# Patient Record
Sex: MALE | Race: WHITE | Employment: FULL TIME | URBAN - METROPOLITAN AREA
[De-identification: names, ages, dates, MRNs, and addresses within clinical notes are randomized per-mention and may not be internally consistent; named-entity substitution may affect disease eponyms.]

---

## 2017-12-20 ENCOUNTER — HOSPITAL ENCOUNTER (OUTPATIENT)
Dept: PHYSICAL THERAPY | Age: 72
Discharge: HOME | End: 2017-12-20
Payer: MEDICARE

## 2017-12-20 PROCEDURE — 97162 PT EVAL MOD COMPLEX 30 MIN: CPT

## 2017-12-20 PROCEDURE — 97110 THERAPEUTIC EXERCISES: CPT

## 2017-12-20 PROCEDURE — G8979 MOBILITY GOAL STATUS: HCPCS

## 2017-12-20 PROCEDURE — G8978 MOBILITY CURRENT STATUS: HCPCS

## 2017-12-20 NOTE — PROGRESS NOTES
Tariq Hensley 29  [facility] [place]  [address] [place], [place], [address] - Phone: [phone]  Fax: [phone] / [address] [facility]  Patient Name: Neto Burk : 1945   Treatment   Diagnosis: (L) hip pain Medical   Diagnosis: Left hip pain [M25.552]   Onset Date: Chronic     Referral Source: Kari Jo MD [facility]): 2017   Prior Hospitalization: See medical history Provider #: [id number]   Prior Level of Function: Limited with prolonged ambulation and ADL activities due to hx of (L) hip pain    Comorbidities: Hx of Cs/Ls Surgery, Tobacco Use, Hx of Prostrate Disease     Medications: Verified on Patient Summary List   The Plan of Care and following information is based on the information from the initial evaluation.   ==================================================================================  Assessment / key information: Pt is a 75 yo male s/p progressive increase in (L) hip pain approx 1 year ago . She presents with pain ranging from 3-8/10, located (L) lateral hip, anterior hip. Pain is made worse with activity, prolonged activity, better with rest, ADLs, sitting. Palpation reveals TTP increase in (L) iliopsoas, (L) TFL, (L) glute med, pectineus. Gait: antalgic with decreased hip extension on (L) . Hip AROM/PROM: flexion 90 deg, extension 10 deg, abduction 35  deg, adduction midline, ER 45 deg,  IR 10 deg. LE MMT: hip flex 4/5, abd 3/5, add 4/5, ext 3/5, knee flex 4/5, ext 4/5, ankle 4/5. Sensation is intact to light touch in the LE.  HS 90/90 30 deg (+) Special tests include: Hip Scour, Alphonse Test, Piriformis Test. (-) Special tests include: SLR, Slump. Pt is limited in the following activities: prolonged walking, ADLs, leisure activities. Pt will benefit from PT interventions to address the aforementioned deficits and allow pt to return to PLOF.   Maia Complexity: History MEDIUM  Complexity : 1-2 comorbidities / personal factors will impact the outcome/ POC ;  Examination  MEDIUM Complexity : 3 Standardized tests and measures addressing body structure, function, activity limitation and / or participation in recreation ; Presentation MEDIUM Complexity : Evolving with changing characteristics ; Decision Making MEDIUM Complexity : FOTO score of 26-74; Overall Complexity HIGH     ==================================================================================  Problem List: pain affecting function, decrease ROM, decrease strength, impaired gait/ balance, decrease ADL/ functional abilitiies, decrease activity tolerance, decrease flexibility/ joint mobility and decrease transfer abilities   Treatment Plan may include any combination of the following: Therapeutic exercise, Therapeutic activities, Neuromuscular re-education, Physical agent/modality, Gait/balance training, Manual therapy and Patient education  Patient / Family readiness to learn indicated by: asking questions, trying to perform skills and interest  Persons(s) to be included in education: patient (P)  Barriers to Learning/Limitations: no  Measures taken:    Patient Goal (s): Decrease pain and increase amb tolerance    Patient self reported health status: good  Rehabilitation Potential: good   Short Term Goals: To be accomplished in  2-3  weeks:  1. Pt will be independent and compliant with HEP to decrease pain, increase ROM and return pt to PLOF. 2. Pt will note pain less than or equal to 5/10 at worst to allow increase in functional abilities. 3. Pt will demonstrates ability to perform ADLs without increase in s/s approx 50% of time to return to PLOF   Long Term Goals: To be accomplished in  4-6  weeks:  1. Increase score on FOTO by > or = to 10 points to demo an increase in functional activity tolerance with the LE.    2. Pt will note < or = 2/10 pain with all mobility to improve comfort with ADLs.   3. Pt will demonstrate GROC >/= 4+ to allow increase in functional activity tolerance   Frequency / Duration:   Patient to be seen  2-3  times per week for 4-6  weeks:  Patient / Caregiver education and instruction: self care, activity modification and exercises  G-Codes (GP): Mobility [id number] Current  CL= 60-79%   Goal  CK= 40-59%. The severity rating is based on the FOTO Score    Therapist Signature: Elana Yeboha DPT, CIMT Date: 08/93/9916   Certification Period: 12/20/17-3/14/18 Time: 9:27 AM   ===========================================================================================  I certify that the above Physical Therapy Services are being furnished while the patient is under my care. I agree with the treatment plan and certify that this therapy is necessary. Physician Signature:        Date:       Time:     Please sign and return to [facility] or you may fax the signed copy to [phone]. Thank you.

## 2017-12-20 NOTE — PROGRESS NOTES
PHYSICAL THERAPY - DAILY TREATMENT NOTE    Patient Name: Steve Michaels        Date: 2017  : 1945   YES Patient  Verified  Visit #:     Insurance: Payor: [place] MED[place] / Plan: [place] MEDICARE PART A & B / Product Type: Medicare /      In time: 900 Out time: 930   Total Treatment Time: 30     TREATMENT AREA =  (L) hip pain    SUBJECTIVE  Pain Level (on 0 to 10 scale):  ie  / 10   Medication Changes/New allergies or changes in medical history, any new surgeries or procedures?     NO    If yes, update Summary List   Subjective Functional Status/Changes:  []  No changes reported     See POC          OBJECTIVE  Modality rationale:      min [] Estim, type:                                          []  att     []  unatt     []  w/US     []  w/ice    []  w/heat    min []  Mechanical Traction: type/lbs                                               []  pro   []  sup   []  int   []  cont    min []  Ultrasound, settings/location:      min []  Iontophoresis:  []  take home patch w/ dexamethazone    min                                []  in clinic w/ dexamethazone    min []  Ice     []  Heat     position:     min []  Other:       min Manual Therapy:       min Therapeutic Exercise:  [x]  See flow sheet   []  Other:      []  Added:     to improve (function):    []  Changed:     to improve (function):      8 min Patient Education:  YES  Reviewed HEP   []  Progressed/Changed HEP based on:   Reviewed therx per flow sheet for (L) hip mobility and strengthening      Other Objective/Functional Measures:    See POC     Post Treatment Pain Level (on 0 to 10) scale:   ie  / 10     ASSESSMENT  []  See Progress Note/Recertification   Patient will continue to benefit from skilled therapy to address remaining functional deficits: Decreased positional tolernace and ADL tolerance    Progress toward goals / Updated goals:    See POC     PLAN  []  Upgrade activities as tolerated YES Continue plan of care   []  Discharge due to : []  Other:      Therapist: Clari Aldana DPT, CIMT    Date: 12/20/2017 Time: 9:25 AM

## 2017-12-27 ENCOUNTER — HOSPITAL ENCOUNTER (OUTPATIENT)
Dept: PHYSICAL THERAPY | Age: 72
Discharge: HOME | End: 2017-12-27
Payer: MEDICARE

## 2017-12-27 PROCEDURE — 97110 THERAPEUTIC EXERCISES: CPT

## 2017-12-27 PROCEDURE — 97140 MANUAL THERAPY 1/> REGIONS: CPT

## 2017-12-27 NOTE — PROGRESS NOTES
PHYSICAL THERAPY - DAILY TREATMENT NOTE      Patient Name: Cinthya Esparza        Date: 2017  : 1945   YES Patient  Verified  Visit #:     Insurance: Payor: Dl Stevenson / Plan: [place] MEDICARE PART A & B / Product Type: Medicare /      In time: 9:05 Out time: 10:10   Total Treatment Time: 65     Medicare Time Tracking (below)   Total Timed Codes (min):  55 1:1 Treatment Time:  35     TREATMENT AREA =  L Hip Pain    SUBJECTIVE    Pain Level (on 0 to 10 scale):  5-6  / 10   Medication Changes/New allergies or changes in medical history, any new surgeries or procedures? NO    If yes, update Summary List   Subjective Functional Status/Changes:  []  No changes reported     \"My pain is made worse by walking > 5 minutes. OBJECTIVE    Modalities Rationale:     Decrease pain and inflammation   min [] Estim, type/location:                                      []  att     []  unatt     []  w/US     []  w/ice    []  w/heat    min []  Mechanical Traction: type/lbs                   []  pro   []  sup   []  int   []  cont    []  before manual    []  after manual    min []  Ultrasound, settings/location:      min []  Iontophoresis w/ dexamethasone, location:                                               []  take home patch       []  in clinic   10 min [x]  Ice     []  Heat    location/position: Supine to L Hip    min []  Vasopneumatic Device, press/temp:     min []  Other:    [x] Skin assessment post-treatment (if applicable):    [x]  intact    []  redness- no adverse reaction     []redness  adverse reaction:      45 min Therapeutic Exercise:  [x]  See flow sheet   Rationale: improve strength, ROM, and stability to improve ambulation tolerance.     10 min Manual Therapy: S/DTM to L psoas/glut med f/b gentle stretching/ROM   Rationale:      decrease pain, increase ROM and increase tissue extensibility to improve patient's ability to ambulate and perform functional ADLs with less pain    X min Patient Education:  YES  Reviewed HEP   []  Progressed/Changed HEP based on: Other Objective/Functional Measures:    Tender to lateral hip. Post Treatment Pain Level (on 0 to 10) scale:   1 / 10     ASSESSMENT    Assessment/Changes in Function:     Initiated TE per flow sheet with pt requiring 100% visual and verbal cueing for correct form. Poor glut med/max strength and fatigues quickly with TE. Modified piriformis st as pt unable to perform supine. []  See Progress Note/Recertification   Patient will continue to benefit from skilled PT services to modify and progress therapeutic interventions, address functional mobility deficits, address ROM deficits, address strength deficits, analyze and address soft tissue restrictions, analyze and cue movement patterns, analyze and modify body mechanics/ergonomics and instruct in home and community integration to attain remaining goals. to attain remaining goals. Progress toward goals / Updated goals: Working towards newly established goals.       PLAN    []  Upgrade activities as tolerated YES Continue plan of care   []  Discharge due to :    []  Other:      Therapist: Karen Baig PTA    Date: 12/27/2017 Time: 8:19 AM

## 2017-12-29 ENCOUNTER — APPOINTMENT (OUTPATIENT)
Dept: PHYSICAL THERAPY | Age: 72
End: 2017-12-29
Payer: MEDICARE

## 2018-01-02 ENCOUNTER — HOSPITAL ENCOUNTER (OUTPATIENT)
Dept: PHYSICAL THERAPY | Age: 73
Discharge: HOME | End: 2018-01-02
Payer: MEDICARE

## 2018-01-02 PROCEDURE — 97140 MANUAL THERAPY 1/> REGIONS: CPT

## 2018-01-02 PROCEDURE — 97110 THERAPEUTIC EXERCISES: CPT

## 2018-01-02 NOTE — PROGRESS NOTES
PHYSICAL THERAPY - DAILY TREATMENT NOTE      Patient Name: Brian Meier        Date: 2018  : 1945   YES Patient  Verified  Visit #:   3   of   12  Insurance: Payor: Fartun Champagne / Plan: [place] MEDICARE PART A & B / Product Type: Medicare /      In time: 930 Out time: 1020   Total Treatment Time: 50     Medicare Time Tracking (below)   Total Timed Codes (min):  40 1:1 Treatment Time:  40     TREATMENT AREA =  Left hip pain [M25.552]    SUBJECTIVE    Pain Level (on 0 to 10 scale):  3-4  / 10   Medication Changes/New allergies or changes in medical history, any new surgeries or procedures? NO    If yes, update Summary List   Subjective Functional Status/Changes:  []  No changes reported     Reports soreness in (L) hip region at onset of visit today.  States that he was turning and felt pain in (L) lateral hip region, had to lay down for several hours      OBJECTIVE  Modalities Rationale:     decrease inflammation, decrease pain and increase tissue extensibility to improve patient's ability to perform ADLS       min [] Estim, type/location:                                      []  att     []  unatt     []  w/US     []  w/ice    []  w/heat    min []  Mechanical Traction: type/lbs                   []  pro   []  sup   []  int   []  cont    []  before manual    []  after manual    min []  Ultrasound, settings/location:      min []  Iontophoresis w/ dexamethasone, location:                                               []  take home patch       []  in clinic   10 min []  Ice     [x]  Heat    location/position: (L) lateral hip     min []  Vasopneumatic Device, press/temp:     min []  Other:    [x] Skin assessment post-treatment (if applicable):    []  intact    []  redness- no adverse reaction     []redness  adverse reaction:      30 min Therapeutic Exercise:  [x]  See flow sheet   Rationale:      increase ROM and increase strength to improve the patients ability to perform ADLs     10 min Manual Therapy: Technique:      [x] S/DTM []IASTM []PROM [] Passive Stretching   [x]manual TPR    []Jt manipulation:Gr I [] II []  III [x] IV[x] V[]  Treatment Area:  DTM to (L) glute med/min, (L) sartorius, (L) TFL, UPA to (R) TP of L5/S1, (R) sacral base    Rationale:      decrease pain, increase ROM and increase tissue extensibility to improve patient's ability to perform ADLS       min Gait Training:    Rationale:        throughout therapy min Patient Education:  YES  Reviewed HEP   []  Progressed/Changed HEP based on: Other Objective/Functional Measures:    Demonstrates increase in hypertonicity (L) posterior hip region at onset of visit      Post Treatment Pain Level (on 0 to 10) scale:   0  / 10     ASSESSMENT    Assessment/Changes in Function:     Reports decrease in pain in (L) hip region post therapy session with no lateral hip pain noted      []  See Progress Note/Recertification   Patient will continue to benefit from skilled PT services to modify and progress therapeutic interventions, address functional mobility deficits, address ROM deficits, address strength deficits, analyze and address soft tissue restrictions and analyze and cue movement patterns to attain remaining goals. Progress toward goals / Updated goals:    Progressing towards goals, pt to MD for f/u.  Will monitor and progress as able      PLAN    []  Upgrade activities as tolerated YES Continue plan of care   []  Discharge due to :    []  Other:      Therapist: Joey Peralta PT    Date: 1/2/2018 Time: 10:11 AM   Future Appointments  Date Time Provider Jessie Triana   1/5/2018 9:30 AM Tulio Rodriguez PT [facility]   1/9/2018 9:30 AM Tulio Rodriguez, PT [facility] AT [facility]   1/11/2018 9:30 AM Leland Segal PT [facility] AT [facility]   1/16/2018 9:30 AM Tulio Rodriguez, PT [facility] AT [facility]   1/18/2018 9:30 AM Tulio Rodriguez, PT [facility] AT [facility]   9/10/2018 10:15 AM Vijaya Kam MD [address] [facility]

## 2018-01-09 ENCOUNTER — HOSPITAL ENCOUNTER (OUTPATIENT)
Dept: PHYSICAL THERAPY | Age: 73
Discharge: HOME | End: 2018-01-09
Payer: MEDICARE

## 2018-01-09 PROCEDURE — 97140 MANUAL THERAPY 1/> REGIONS: CPT

## 2018-01-09 PROCEDURE — 97110 THERAPEUTIC EXERCISES: CPT

## 2018-01-09 NOTE — PROGRESS NOTES
PHYSICAL THERAPY - DAILY TREATMENT NOTE    Patient Name: Brian Meier        Date: 2018  : 1945    Patient  Verified: YES  Visit #:      of   12  Insurance: Payor: Fartun Champagne / Plan: [place] MEDICARE PART A & B / Product Type: Medicare /      In time: 9:30 Out time: 10:15   Total Treatment Time: 39     Medicare Time Tracking (below)   Total Timed Codes (min):  35 1:1 Treatment Time:  25     TREATMENT AREA/ DIAGNOSIS = Left hip pain [M25.552]    SUBJECTIVE  Pain Level (on 0 to 10 scale):  2  / 10   Medication Changes/New allergies or changes in medical history, any new surgeries or procedures?     NO    If yes, update Summary List   Subjective Functional Status/Changes:  []  No changes reported     Functional improvement the work [employer] did really helped   Functional limitation L hip pain      OBJECTIVE  Modalities Rationale:     decrease pain and increase tissue extensibility to improve patient's ability to perform ADLs without pain   min [] Estim, type/location:                                      []  att     []  unatt     []  w/US     []  w/ice    []  w/heat    min []  Mechanical Traction: type/lbs                   []  pro   []  sup   []  int   []  cont    []  before manual    []  after manual    min []  Ultrasound, settings/location:      min []  Iontophoresis w/ dexamethasone, location:                                               []  take home patch       []  in clinic   10 min []  Ice     [x]  Heat    location/position:     min []  Vasopneumatic Device, press/temp:     min []  Other:    [x] Skin assessment post-treatment (if applicable):    [x]  intact    [x]  redness- no adverse reaction     []redness  adverse reaction:        10 min Manual Therapy: DTM/manual rolling to lateral thigh, TrP release to L glute medius and piriformis, PROM to L hip    Rationale:      decrease pain, increase ROM and increase tissue extensibility to improve patient's ability to perform ADLs without pain    25 min Therapeutic Exercise:  [x]  See flow sheet   Rationale:      increase ROM and increase strength to improve the patients ability to perform ADLs without pain          min Patient Education:  Yes    [x] Reviewed HEP   []  Progressed/Changed HEP based on: Other Objective/Functional Measures:    Pt with L hip pain, he reports pain relief with manual therapy and heat last visit  Pt with TrP in L glute medius  Pain with FWB during therex, but pain was abolished with MHP    Post Treatment Pain Level (on 0 to 10) scale:   0  / 10     ASSESSMENT  Assessment/Changes in Function:     No pain after therapy  Pt responds well to MHP      []  See Progress Note/Recertification   Patient will continue to benefit from skilled PT services to modify and progress therapeutic interventions, address functional mobility deficits, address ROM deficits, address strength deficits, analyze and address soft tissue restrictions, analyze and cue movement patterns and analyze and modify body mechanics/ergonomics to attain remaining goals.    Progress toward goals / Updated goals:    pain abolished with therapy     PLAN  [x]  Upgrade activities as tolerated YES Continue plan of care   []  Discharge due to :    []  Other:      Therapist: Ashley Alvarez PT    Date: 1/9/2018 Time: 12:08 PM        Future Appointments  Date Time Provider Jessie Triana   1/11/2018 9:30 AM Blessing Segal PT [facility]   1/16/2018 9:30 AM Ashley Alvarez PT [facility]   1/18/2018 9:30 AM Ashley Alvarez PT [facility] AT [facility]   9/10/2018 10:15 AM Andres Jacob MD [id number]

## 2018-01-16 ENCOUNTER — HOSPITAL ENCOUNTER (OUTPATIENT)
Dept: PHYSICAL THERAPY | Age: 73
Discharge: HOME | End: 2018-01-16
Payer: MEDICARE

## 2018-01-16 PROCEDURE — 97110 THERAPEUTIC EXERCISES: CPT

## 2018-01-16 PROCEDURE — 97140 MANUAL THERAPY 1/> REGIONS: CPT

## 2018-01-16 NOTE — PROGRESS NOTES
PHYSICAL THERAPY - DAILY TREATMENT NOTE    Patient Name: Randi Zapata        Date: 2018  : 1945    Patient  Verified: YES  Visit #:     Insurance: Payor: Javier Briscoe / Plan: [place] MEDICARE PART A & B / Product Type: Medicare /      In time: 9:30 Out time: 10:10   Total Treatment Time: 40     Medicare Time Tracking (below)   Total Timed Codes (min):  30 1:1 Treatment Time:  30     TREATMENT AREA/ DIAGNOSIS = Left hip pain [M25.552]    SUBJECTIVE  Pain Level (on 0 to 10 scale):  2  / 10   Medication Changes/New allergies or changes in medical history, any new surgeries or procedures?     NO    If yes, update Summary List   Subjective Functional Status/Changes:  []  No changes reported     Functional improvement its getting better   Functional limitation pain with prolonged walking      OBJECTIVE  Modalities Rationale:     decrease pain and increase tissue extensibility to improve patient's ability to perform ADLs without pain   min [] Estim, type/location:                                      []  att     []  unatt     []  w/US     []  w/ice    []  w/heat    min []  Mechanical Traction: type/lbs                   []  pro   []  sup   []  int   []  cont    []  before manual    []  after manual    min []  Ultrasound, settings/location:      min []  Iontophoresis w/ dexamethasone, location:                                               []  take home patch       []  in clinic   10 min []  Ice     [x]  Heat    location/position:     min []  Vasopneumatic Device, press/temp:     min []  Other:    [x] Skin assessment post-treatment (if applicable):    [x]  intact    [x]  redness- no adverse reaction     []redness  adverse reaction:        10 min Manual Therapy: Manual rolling to lateral L thigh, DTM/TrP release to L gluteals/piriformis   Rationale:      decrease pain, increase ROM and increase tissue extensibility to improve patient's ability to perform ADLs without pain    20 min Therapeutic Exercise: [x]  See flow sheet   Rationale:      increase ROM, increase strength and improve balance to improve the patients ability to perform ADLs without pain          min Patient Education:  Yes    [x] Reviewed HEP   []  Progressed/Changed HEP based on: Other Objective/Functional Measures:    Pt with less pain lately  Pain with prolonged walking, starting in lateral hip and moving to groin   Post Treatment Pain Level (on 0 to 10) scale:   1  / 10     ASSESSMENT  Assessment/Changes in Function:     Pt responding well to soft tissue work and strengthening     [x]  See Progress Note/Recertification   Patient will continue to benefit from skilled PT services to modify and progress therapeutic interventions, address functional mobility deficits, address ROM deficits, address strength deficits, analyze and address soft tissue restrictions and analyze and cue movement patterns to attain remaining goals. Progress toward goals / Updated goals:    Less pain.      PLAN  [x]  Upgrade activities as tolerated YES Continue plan of care   []  Discharge due to :    []  Other:      Therapist: Elma Khan PT    Date: 1/16/2018 Time: 9:30 AM        Future Appointments  Date Time Provider Jessie Triana   1/18/2018 9:30 AM Elma Khan PT [facility]   9/10/2018 10:15 AM Tulio Noble MD [id number]

## 2018-01-18 ENCOUNTER — APPOINTMENT (OUTPATIENT)
Dept: PHYSICAL THERAPY | Age: 73
End: 2018-01-18
Payer: MEDICARE

## 2018-01-19 ENCOUNTER — HOSPITAL ENCOUNTER (OUTPATIENT)
Dept: PHYSICAL THERAPY | Age: 73
Discharge: HOME | End: 2018-01-19
Payer: MEDICARE

## 2018-01-19 PROCEDURE — 97140 MANUAL THERAPY 1/> REGIONS: CPT

## 2018-01-19 PROCEDURE — 97110 THERAPEUTIC EXERCISES: CPT

## 2018-01-19 NOTE — PROGRESS NOTES
PHYSICAL THERAPY - DAILY TREATMENT NOTE      Patient Name: Keo Love        Date: 2018  : 1945   YES Patient  Verified  Visit #:     Insurance: Payor: Emily Alvarez / Plan: [place] MEDICARE PART A & B / Product Type: Medicare /      In time: 930 Out time:    Total Treatment Time: 45     Medicare Time Tracking (below)   Total Timed Codes (min):  35 1:1 Treatment Time:  35     TREATMENT AREA =  Left hip pain [M25.552]    SUBJECTIVE    Pain Level (on 0 to 10 scale):  2  / 10   Medication Changes/New allergies or changes in medical history, any new surgeries or procedures?     NO    If yes, update Summary List   Subjective Functional Status/Changes:  []  No changes reported     Reports soreness in (L) hip region gradually decreasing      OBJECTIVE  Modalities Rationale:     decrease inflammation, decrease pain and increase tissue extensibility to improve patient's ability to perform ADLs      min [] Estim, type/location:                                      []  att     []  unatt     []  w/US     []  w/ice    []  w/heat    min []  Mechanical Traction: type/lbs                   []  pro   []  sup   []  int   []  cont    []  before manual    []  after manual    min []  Ultrasound, settings/location:      min []  Iontophoresis w/ dexamethasone, location:                                               []  take home patch       []  in clinic   10 min []  Ice     [x]  Heat    location/position: (L) hip     min []  Vasopneumatic Device, press/temp:     min []  Other:    [x] Skin assessment post-treatment (if applicable):    [x]  intact    []  redness- no adverse reaction     []redness  adverse reaction:      25 min Therapeutic Exercise:  [x]  See flow sheet   Rationale:      increase ROM and increase strength to improve the patients ability to perform ADLs      10 min Manual Therapy: Technique:      [x] S/DTM []IASTM []PROM [] Passive Stretching   [x]manual TPR    []Jt manipulation:Gr I [] II [] III [] IV[] V[]  Treatment Area:  DTM to (L) sartorius, TFL, glute med, glute minimus   Rationale:      decrease pain, increase ROM, increase tissue extensibility and decrease trigger points to improve patient's ability to perform ADLs     []  Progressed/Changed HEP based on: Other Objective/Functional Measures:    Con't with mild lateral hip pain with TTP and hypertonicity with TPs in (L) sartorius, TFL, and glute med/minimus mm      Post Treatment Pain Level (on 0 to 10) scale:   2 / 10     ASSESSMENT    Assessment/Changes in Function:     Progressing slowly with pain control and mobility/strength of (L) hip   []  See Progress Note/Recertification   Patient will continue to benefit from skilled PT services to modify and progress therapeutic interventions, address functional mobility deficits, address ROM deficits, address strength deficits, analyze and address soft tissue restrictions and analyze and cue movement patterns to attain remaining goals. Progress toward goals / Updated goals:     Will monitor and progress as able      PLAN    []  Upgrade activities as tolerated YES Continue plan of care   []  Discharge due to :    []  Other:      Therapist: Ysabel Dickens PT    Date: 1/19/2018 Time: 10:09 AM   Future Appointments  Date Time Provider Jessie Triana   1/22/2018 9:30 AM [address] PT [place] [address] [facility] AT [address]   1/26/2018 9:30 AM [facility] PT [place] [address] [facility] AT [address]   9/10/2018 10:15 AM Jd Jaimes MD [address]

## 2018-01-22 ENCOUNTER — HOSPITAL ENCOUNTER (OUTPATIENT)
Dept: PHYSICAL THERAPY | Age: 73
Discharge: HOME | End: 2018-01-22
Payer: MEDICARE

## 2018-01-22 PROCEDURE — 97140 MANUAL THERAPY 1/> REGIONS: CPT

## 2018-01-22 PROCEDURE — 97110 THERAPEUTIC EXERCISES: CPT

## 2018-01-22 PROCEDURE — G8978 MOBILITY CURRENT STATUS: HCPCS

## 2018-01-22 PROCEDURE — G8979 MOBILITY GOAL STATUS: HCPCS

## 2018-01-22 NOTE — PROGRESS NOTES
Tariq Hensley 31  [facility] [address] [facility], [address] [place] [address], [address] [place] [address], [address] [place] [address]  Phone: [phone]  Fax: [phone]  PROGRESS NOTE  Patient Name: Loren Bower : 1945   Treatment/Medical Diagnosis: Left hip pain [M25.552]   Referral Source: David Edwards MD     Date of Initial Visit: 2017 Attended Visits: 7 Missed Visits: 2     SUMMARY OF TREATMENT  Treatment has consisted of manual therapy,  therapeutic exercise, modalities as needed for pain control, and instruction and progression of a home exercise program.     CURRENT STATUS  Patient reports approximately 15% overall improvement since initial evaluation with pain levels rated 0/10 @ best,  2-3/10 pain level on average, increased to 8/10 @ worst with walking, ascending stairs, and standing > 5 minutes. Pain improves with rest (sitting and/or laying), heat, and manual therapy @ PT sessions with pt reporting approx 6 hours of relief. Pt remains tender to L lateral hip and reduced ROM/flexibility. FOTO: 58 which is improved from 40 @ initial eval.   GROC: +2  Hip AROM in degrees: Flex: 90  Extension: 10  Abd: 35    LE MMT: Hip Flex:4+/5  Hip Abd:3+/5  Knee Flex:4+/5  Knee Ext:5/5      Goal/Measure of Progress Goal Met? 1. Pt will be independent and compliant with HEP to decrease pain, increase ROM and return pt to PLOF. Status at last Eval: Established Current Status: No no   2. Pt will note pain less than or equal to 5/10 at worst to allow increase in functional abilities. Status at last Eval: 810 Current Status: 8/10 no   3. Pt will demonstrates ability to perform ADLs without increase in s/s approx 50% of time to return to PLOF   Status at last Eval: N/A Current Status: no no   4. Increase score on FOTO by > or = to 10 points to demo an increase in functional activity tolerance with the LE.     Status at last Eval: 40 Current Status: 58  (18) yes     New Goals to be achieved in __4__  weeks:  1. Pt will be independent and compliant with a progressive HEP to prepare for D/C.   2.  Increase score on FOTO by > or = to 10 points to demo an increase in functional activity tolerance with the LE. 3.  Pt will note < or = 2/10 pain with all mobility to improve comfort with ADLs. Pt will demonstrate GROC >/= 4+ to allow increase in functional activity tolerance     G-Codes (GP): Mobility [id number] Current  CK= 40-59%   Goal  CK= 40-59%. The severity rating is based on the FOTO Score. RECOMMENDATIONS  Cont PT 2x per week for additional 4 weeks. If you have any questions/comments please contact us directly at [phone]. Thank you for allowing us to assist in the care of your patient. LPTA Signature: Jonatan Barfield PTA  Date: 1/22/2018   PT Signature:  Certification Period: 12/20/17-3/14/18 Time: 8:22 AM       NOTE TO PHYSICIAN:  PLEASE COMPLETE THE ORDERS BELOW AND FAX TO   [facility] at [address]: [phone]. If you are unable to process this request in 24 hours please contact our office: [phone].    ___ I have read the above report and request that my patient continue as recommended.   ___ I have read the above report and request that my patient continue therapy with the following changes/special instructions:_________________________________________________________   ___ I have read the above report and request that my patient be discharged from therapy.      Physician Signature:        Date:       Time:

## 2018-01-22 NOTE — PROGRESS NOTES
PHYSICAL THERAPY - DAILY TREATMENT NOTE  8-    Patient Name: Guillermo Vickers        Date: 2018  : 1945   YES Patient  Verified  Visit #:     Insurance: Payor: [place] MEDICARE / Plan: VA MEDICARE PART A & B / Product Type: Medicare /      In time: 9:30 Out time: 10:20   Total Treatment Time: 50     Medicare Time Tracking (below)   Total Timed Codes (min):  40 1:1 Treatment Time:  40     TREATMENT AREA =  Left hip pain [M25.552]    SUBJECTIVE    Pain Level (on 0 to 10 scale):  2-3  10   Medication Changes/New allergies or changes in medical history, any new surgeries or procedures? NO    If yes, update Summary List   Subjective Functional Status/Changes:  []  No changes reported     \"The pain is less intense overall. \"     OBJECTIVE  Modalities Rationale:     decrease inflammation, decrease pain and increase tissue extensibility to improve patient's ability to perform ADLs      min [] Estim, type/location:                                      []  att     []  unatt     []  w/US     []  w/ice    []  w/heat    min []  Mechanical Traction: type/lbs                   []  pro   []  sup   []  int   []  cont    []  before manual    []  after manual    min []  Ultrasound, settings/location:      min []  Iontophoresis w/ dexamethasone, location:                                               []  take home patch       []  in clinic   10 min []  Ice     [x]  Heat    location/position: (L) hip     min []  Vasopneumatic Device, press/temp:     min []  Other:    [x] Skin assessment post-treatment (if applicable):    [x]  intact    []  redness- no adverse reaction     []redness  adverse reaction:      30 min Therapeutic Exercise:  [x]  See flow sheet   Rationale:      increase ROM and increase strength to improve the patients ability to perform ADLs      10 min Manual Therapy: DTM/manual rolling to lateral thigh, TrP release to L glute medius and piriformis, PROM to L hip    Rationale:      decrease pain, increase ROM, increase tissue extensibility and decrease trigger points to improve patient's ability to perform ADLs     Other Objective/Functional Measures:    See PN. FOTO: 62  GROC: +2 (a little bit better)     Post Treatment Pain Level (on 0 to 10) scale:   1  / 10     ASSESSMENT    Assessment/Changes in Function:     See PN.     []  See Progress Note/Recertification   Patient will continue to benefit from skilled PT services to modify and progress therapeutic interventions, address functional mobility deficits, address ROM deficits, address strength deficits, analyze and address soft tissue restrictions and analyze and cue movement patterns to attain remaining goals. Progress toward goals / Updated goals:    See PN.      PLAN    []  Upgrade activities as tolerated YES Continue plan of care   []  Discharge due to :    []  Other:      Therapist: Vonne Duane, PTA    Date: 1/22/2018 Time: 10:09 AM     Future Appointments  Date Time Provider Jessie Triana   1/22/2018 9:30 AM [facility] PT [place] [address] REHAB CENTER AT [facility]   1/26/2018 9:30 AM [facility] PT [place] [address] REH[facility]ENTER AT [facility]   9/10/2018 10:15 AM Marion Segundo MD [address] [facility]

## 2018-01-26 ENCOUNTER — HOSPITAL ENCOUNTER (OUTPATIENT)
Dept: PHYSICAL THERAPY | Age: 73
Discharge: HOME | End: 2018-01-26
Payer: MEDICARE

## 2018-01-26 PROCEDURE — 97110 THERAPEUTIC EXERCISES: CPT

## 2018-01-26 PROCEDURE — 97140 MANUAL THERAPY 1/> REGIONS: CPT

## 2018-01-26 NOTE — PROGRESS NOTES
PHYSICAL THERAPY - DAILY TREATMENT NOTE      Patient Name: Vashti Washington        Date: 2018  : 1945   YES Patient  Verified  Visit #:     Insurance: Payor: VA MEDICARE / Plan: VA MEDICARE PART A & B / Product Type: Medicare /      In time: 9:30 Out time: 10:25   Total Treatment Time: 54     Medicare Time Tracking (below)   Total Timed Codes (min):  45 1:1 Treatment Time:  45     TREATMENT AREA =  Left hip pain [M25.552]    SUBJECTIVE    Pain Level (on 0 to 10 scale):  2 / 10   Medication Changes/New allergies or changes in medical history, any new surgeries or procedures? NO    If yes, update Summary List   Subjective Functional Status/Changes:  []  No changes reported     \"Today seems to be a good day. I'm going to call Dr. Ascencion Lopez office today. I sometimes do my exercises at home. \"       OBJECTIVE  Modalities Rationale:     decrease inflammation, decrease pain and increase tissue extensibility to improve patient's ability to perform ADLs      min [] Estim, type/location:                                      []  att     []  unatt     []  w/US     []  w/ice    []  w/heat    min []  Mechanical Traction: type/lbs                   []  pro   []  sup   []  int   []  cont    []  before manual    []  after manual    min []  Ultrasound, settings/location:      min []  Iontophoresis w/ dexamethasone, location:                                               []  take home patch       []  in clinic   10 min []  Ice     [x]  Heat    location/position: (L) hip in R S/L    min []  Vasopneumatic Device, press/temp:     min []  Other:    [x] Skin assessment post-treatment (if applicable):    [x]  intact    []  redness- no adverse reaction     []redness  adverse reaction:      35 min Therapeutic Exercise:  [x]  See flow sheet   Rationale:      increase ROM and increase strength to improve the patients ability to perform ADLs      10 min Manual Therapy: DTM/manual rolling to lateral thigh, TrP release to L glute medius and piriformis, PROM to L hip    Rationale:      decrease pain, increase ROM, increase tissue extensibility and decrease trigger points to improve patient's ability to perform ADLs     Other Objective/Functional Measures:    TE per flow sheet. Added standing hip 3 way and SR/EC. Post Treatment Pain Level (on 0 to 10) scale:   0  / 10     ASSESSMENT    Assessment/Changes in Function:     PT provides temporarily relief but WB'ing > 5 minutes cont to increase L hip sx. Advised pt to scheduled follow up with MD due to minimal progress and cont to encourage daily stretching and HEP. Unable to tolerate hip 3 way with [address] on [address].      []  See Progress Note/Recertification   Patient will continue to benefit from skilled PT services to modify and progress therapeutic interventions, address functional mobility deficits, address ROM deficits, address strength deficits, analyze and address soft tissue restrictions and analyze and cue movement patterns to attain remaining goals. Progress toward goals / Updated goals:    Encouraging HEP compliance. Slow progress towards pain goals.       PLAN    []  Upgrade activities as tolerated YES Continue plan of care   []  Discharge due to :    []  Other:      Therapist: Belgica Parnell PTA    Date: 1/26/2018 Time: 10:09 AM     Future Appointments  Date Time Provider Jessie Triana   1/26/2018 9:30 AM [facility] PT [place] [address] REHAB CENTER AT [facility]   9/10/2018 10:15 AM MD Eunice Scott

## 2018-01-29 ENCOUNTER — HOSPITAL ENCOUNTER (OUTPATIENT)
Dept: PHYSICAL THERAPY | Age: 73
Discharge: HOME | End: 2018-01-29
Payer: MEDICARE

## 2018-01-29 PROCEDURE — 97110 THERAPEUTIC EXERCISES: CPT

## 2018-01-29 PROCEDURE — 97140 MANUAL THERAPY 1/> REGIONS: CPT

## 2018-01-29 NOTE — PROGRESS NOTES
PHYSICAL THERAPY - DAILY TREATMENT NOTE      Patient Name: Marsha Parker        Date: 2018  : 1945   YES Patient  Verified  Visit #:     Insurance: Payor: Patricia Chan / Plan: VA MEDICARE PART A & B / Product Type: Medicare /      In time: 9:30 Out time: 10:25   Total Treatment Time: 54     Medicare Time Tracking (below)   Total Timed Codes (min):  45 1:1 Treatment Time:  45     TREATMENT AREA =  Left hip pain [M25.552]    SUBJECTIVE    Pain Level (on 0 to 10 scale):  2 / 10   Medication Changes/New allergies or changes in medical history, any new surgeries or procedures? NO    If yes, update Summary List   Subjective Functional Status/Changes:  []  No changes reported     \"I had to walk far yesterday at [employer] and that really bothered my hip. I see Dr. Darrell Caldwell 18.        OBJECTIVE  Modalities Rationale:     decrease inflammation, decrease pain and increase tissue extensibility to improve patient's ability to perform ADLs      min [] Estim, type/location:                                      []  att     []  unatt     []  w/US     []  w/ice    []  w/heat    min []  Mechanical Traction: type/lbs                   []  pro   []  sup   []  int   []  cont    []  before manual    []  after manual    min []  Ultrasound, settings/location:      min []  Iontophoresis w/ dexamethasone, location:                                               []  take home patch       []  in clinic   10 min []  Ice     [x]  Heat    location/position: (L) hip in R S/L    min []  Vasopneumatic Device, press/temp:     min []  Other:    [x] Skin assessment post-treatment (if applicable):    [x]  intact    []  redness- no adverse reaction     []redness  adverse reaction:      35 min Therapeutic Exercise:  [x]  See flow sheet   Rationale:      increase ROM and increase strength to improve the patients ability to perform ADLs      10 min Manual Therapy: Roller to lateral thigh, TrP release to L glute medius and piriformis   Rationale:      decrease pain, increase ROM, increase tissue extensibility and decrease trigger points to improve patient's ability to perform ADLs     Other Objective/Functional Measures:    Unable to perform SR/EC > 5 seconds. Challenged with FT/EC on foam.      Post Treatment Pain Level (on 0 to 10) scale:   1  / 10     ASSESSMENT    Assessment/Changes in Function:     Slow improvement in regards to functional status; pt will follow up with MD on 2/19/18. Walking >10-15' increases lat hip pain. []  See Progress Note/Recertification   Patient will continue to benefit from skilled PT services to modify and progress therapeutic interventions, address functional mobility deficits, address ROM deficits, address strength deficits, analyze and address soft tissue restrictions and analyze and cue movement patterns to attain remaining goals. Progress toward goals / Updated goals:    Encouraging HEP compliance. Slow progress towards pain goals.       PLAN    []  Upgrade activities as tolerated YES Continue plan of care   []  Discharge due to :    []  Other:      Therapist: Belgica Parnell PTA    Date: 1/29/2018 Time: 10:09 AM     Future Appointments  Date Time Provider Jessie Triana   1/29/2018 9:30 AM [facility] PT [place] [address] [facility] AT [facility]   2/2/2018 9:30 AM [facility] PT [place] 2 [facility]   2/6/2018 9:30 AM Korina Smith, PT REHAB CENTER AT [facility]   2/8/2018 9:30 AM Severo Roca, PT REHAB CENTER AT [facility]   9/10/2018 10:15 AM MD Eunice Scott

## 2018-02-02 ENCOUNTER — HOSPITAL ENCOUNTER (OUTPATIENT)
Dept: PHYSICAL THERAPY | Age: 73
Discharge: HOME | End: 2018-02-02
Payer: MEDICARE

## 2018-02-02 PROCEDURE — 97140 MANUAL THERAPY 1/> REGIONS: CPT

## 2018-02-02 PROCEDURE — 97110 THERAPEUTIC EXERCISES: CPT

## 2018-02-02 NOTE — PROGRESS NOTES
PHYSICAL THERAPY - DAILY TREATMENT NOTE      Patient Name: Dominick Mott        Date: 2018  : 1945   YES Patient  Verified  Visit #:   10   of   12  Insurance: Payor: Inga Davenport / Plan: VA MEDICARE PART A & B / Product Type: Medicare /      In time: 9:30 Out time: 10:20   Total Treatment Time: 50     Medicare Time Tracking (below)   Total Timed Codes (min):  40 1:1 Treatment Time:  40     TREATMENT AREA =  Left hip pain [M25.552]    SUBJECTIVE    Pain Level (on 0 to 10 scale):  2 / 10   Medication Changes/New allergies or changes in medical history, any new surgeries or procedures? NO    If yes, update Summary List   Subjective Functional Status/Changes:  []  No changes reported     \"I feel about the same. The pain got up to a 5 the other day because I did a lot of walking. \"       OBJECTIVE  Modalities Rationale:     decrease inflammation, decrease pain and increase tissue extensibility to improve patient's ability to perform ADLs      min [] Estim, type/location:                                      []  att     []  unatt     []  w/US     []  w/ice    []  w/heat    min []  Mechanical Traction: type/lbs                   []  pro   []  sup   []  int   []  cont    []  before manual    []  after manual    min []  Ultrasound, settings/location:      min []  Iontophoresis w/ dexamethasone, location:                                               []  take home patch       []  in clinic   10 min []  Ice     [x]  Heat    location/position: (L) hip in R S/L    min []  Vasopneumatic Device, press/temp:     min []  Other:    [x] Skin assessment post-treatment (if applicable):    [x]  intact    []  redness- no adverse reaction     []redness  adverse reaction:      30 min Therapeutic Exercise:  [x]  See flow sheet   Rationale:      increase ROM and increase strength to improve the patients ability to perform ADLs      10 min Manual Therapy: Roller to lateral thigh, TrP release to L glute medius and piriformis   Rationale:      decrease pain, increase ROM, increase tissue extensibility and decrease trigger points to improve patient's ability to perform ADLs     Other Objective/Functional Measures:    Unable to perform SR/EC > 5 seconds. Challenged with FT/EC on foam.   Progressed hold time with bridge and RTB with clams. Post Treatment Pain Level (on 0 to 10) scale:   0 / 10     ASSESSMENT    Assessment/Changes in Function:     Improved tolerance to MT with less pain noted overall. Minimal progress with functional activity tolerance; unable to walk >10 minutes without increasing sx.      []  See Progress Note/Recertification   Patient will continue to benefit from skilled PT services to modify and progress therapeutic interventions, address functional mobility deficits, address ROM deficits, address strength deficits, analyze and address soft tissue restrictions and analyze and cue movement patterns to attain remaining goals. Progress toward goals / Updated goals:    Encouraging HEP compliance. Slow progress towards pain goals.       PLAN    []  Upgrade activities as tolerated YES Continue plan of care   []  Discharge due to :    []  Other:      Therapist: Sanjeev Cazares PTA    Date: 2/2/2018 Time: 10:09 AM     Future Appointments  Date Time Provider Jessie Triana   2/2/2018 9:30 AM [facility] PT [place] [facility] REH[facility] AT [facility]   2/6/2018 9:30 AM Melany Anderson, PT REHAB CENTER AT [facility]   2/8/2018 9:30 AM Jesús Wood, PT REHAB CENTER AT [facility]   9/10/2018 10:15 AM Mari Downey MD [id number]

## 2018-02-06 ENCOUNTER — HOSPITAL ENCOUNTER (OUTPATIENT)
Dept: PHYSICAL THERAPY | Age: 73
Discharge: HOME | End: 2018-02-06
Payer: MEDICARE

## 2018-02-06 PROCEDURE — 97140 MANUAL THERAPY 1/> REGIONS: CPT

## 2018-02-06 PROCEDURE — 97110 THERAPEUTIC EXERCISES: CPT

## 2018-02-06 NOTE — PROGRESS NOTES
PHYSICAL THERAPY - DAILY TREATMENT NOTE      Patient Name: Jaye Cain        Date: 2018  : 1945   YES Patient  Verified  Visit #:     Insurance: Payor: Marcos Nick / Plan: [place] MED[place]ARE PART A & B / Product Type: Medicare /      In time: 9:40 Out time: 10:25   Total Treatment Time: 39     Medicare Time Tracking (below)   Total Timed Codes (min):  35 1:1 Treatment Time:  35     TREATMENT AREA =  Left hip pain [M25.552]    SUBJECTIVE    Pain Level (on 0 to 10 scale):  3  / 10   Medication Changes/New allergies or changes in medical history, any new surgeries or procedures?     NO    If yes, update Summary List   Subjective Functional Status/Changes:  []  No changes reported       Functional improvements: none reported  Functional impairments: walking         OBJECTIVE  Modalities Rationale:     decrease pain to improve patient's ability to walk      min [] Estim, type/location:                                      []  att     []  unatt     []  w/US     []  w/ice    []  w/heat    min []  Mechanical Traction: type/lbs                   []  pro   []  sup   []  int   []  cont    []  before manual    []  after manual    min []  Ultrasound, settings/location:      min []  Iontophoresis w/ dexamethasone, location:                                               []  take home patch       []  in clinic   10 min []  Ice     [x]  Heat    location/position: S/L left hip    min []  Vasopneumatic Device, press/temp:     min []  Other:    [x] Skin assessment post-treatment (if applicable):    [x]  intact    []  redness- no adverse reaction     []redness  adverse reaction:      25 min Therapeutic Exercise:  [x]  See flow sheet   Rationale:      increase ROM and increase strength to improve the patients ability to walk     10 min Manual Therapy: Technique:      [] S/DTM []IASTM []PROM [] Passive Stretching   []manual TPR    []Jt manipulation:Gr I [] II []  III [] IV[] V[]  Treatment Area:  S/L left hip DTM glute med,ITB,piriformis   Rationale:      decrease pain, increase ROM and increase tissue extensibility to improve patient's ability to walk       min Patient Education:  YES  Reviewed HEP   []  Progressed/Changed HEP based on: Other Objective/Functional Measures:    Patient reported increase bilateral radiculopathy with standing therex. Hold today due to stenosis symptoms. Post Treatment Pain Level (on 0 to 10) scale:   0  / 10     ASSESSMENT    Assessment/Changes in Function:     Patient reported flare up in symptoms after heavy lifting over the weekend. Patient to follow up with MD 2/19/18. []  See Progress Note/Recertification   Patient will continue to benefit from skilled PT services to modify and progress therapeutic interventions, address functional mobility deficits, address ROM deficits, address strength deficits, analyze and address soft tissue restrictions, analyze and cue movement patterns, analyze and modify body mechanics/ergonomics and assess and modify postural abnormalities to attain remaining goals. to attain remaining goals. Progress toward goals / Updated goals:    Limited progress due to pain.      PLAN    [x]  Upgrade activities as tolerated YES Continue plan of care   []  Discharge due to :    []  Other:      Therapist: Bertrand Ott PT    Date: 2/6/2018 Time: 10:16 AM   Future Appointments  Date Time Provider Jessie Triana   2/8/2018 9:30 AM Alejandro Blanchard, PT [facility] AT [facility]   9/10/2018 10:15 [facility]

## 2018-02-08 ENCOUNTER — APPOINTMENT (OUTPATIENT)
Dept: PHYSICAL THERAPY | Age: 73
End: 2018-02-08
Payer: MEDICARE

## 2018-02-14 ENCOUNTER — HOSPITAL ENCOUNTER (OUTPATIENT)
Dept: PHYSICAL THERAPY | Age: 73
Discharge: HOME | End: 2018-02-14
Payer: MEDICARE

## 2018-02-14 PROCEDURE — 97110 THERAPEUTIC EXERCISES: CPT

## 2018-02-14 NOTE — PROGRESS NOTES
PHYSICAL THERAPY - DAILY TREATMENT NOTE    Patient Name: Brian Meier        Date: 2018  : 1945    Patient  Verified: YES  Visit #:     Insurance: Payor: Fartun Champagne / Plan: [place] MEDICARE PART A & B / Product Type: Medicare /      In time: 9:35 Out time: 10:10   Total Treatment Time: 35     Medicare Time Tracking (below)   Total Timed Codes (min):  25 1:1 Treatment Time:  25     TREATMENT AREA/ DIAGNOSIS = Left hip pain [M25.552]    SUBJECTIVE  Pain Level (on 0 to 10 scale):  0  / 10   Medication Changes/New allergies or changes in medical history, any new surgeries or procedures?     NO    If yes, update Summary List   Subjective Functional Status/Changes:  []  No changes reported     Functional improvement no pain the last few days, and better than when I started   Functional limitation  Ivy had a few bad days      OBJECTIVE  Modalities Rationale:     decrease edema, decrease inflammation and decrease pain to improve patient's ability to perform ADLs without pain   min [] Estim, type/location:                                      []  att     []  unatt     []  w/US     []  w/ice    []  w/heat    min []  Mechanical Traction: type/lbs                   []  pro   []  sup   []  int   []  cont    []  before manual    []  after manual    min []  Ultrasound, settings/location:      min []  Iontophoresis w/ dexamethasone, location:                                               []  take home patch       []  in clinic   10 min [x]  Ice     []  Heat    location/position:     min []  Vasopneumatic Device, press/temp:     min []  Other:    [] Skin assessment post-treatment (if applicable):    []  intact    []  redness- no adverse reaction     []redness  adverse reaction:        5 min Manual Therapy: L piriformis release and hip ER/IR PROM   Rationale:      decrease pain, increase ROM and increase tissue extensibility to improve patient's ability to perform ADLs without pain    20 min Therapeutic Exercise:  [x]  See flow sheet   Rationale:      increase ROM and increase strength to improve the patients ability to perform ADLs without pain          min Patient Education:  Yes    [x] Reviewed HEP   []  Progressed/Changed HEP based on: Other Objective/Functional Measures:    No pain today, better than when he started  Mild tenderness around his L greater trochanter  Added more glute medius strengthening  Added ice after treatement     Post Treatment Pain Level (on 0 to 10) scale:   0  / 10     ASSESSMENT  Assessment/Changes in Function:     Pt progressing well     []  See Progress Note/Recertification   Patient will continue to benefit from skilled PT services to modify and progress therapeutic interventions, address functional mobility deficits, address ROM deficits, address strength deficits, analyze and address soft tissue restrictions, analyze and cue movement patterns, analyze and modify body mechanics/ergonomics and assess and modify postural abnormalities to attain remaining goals.    Progress toward goals / Updated goals:    Less pain in general     PLAN  [x]  Upgrade activities as tolerated YES Continue plan of care   []  Discharge due to :    [x]  Other: Assess response to ice     Therapist: Lynett Snellen, PT    Date: 2/14/2018 Time: 10:10 AM        Future Appointments  Date Time Provider Jessie Triana   2/16/2018 8:30 AM Haily Richardson [facility] AT [facility]   9/10/2018 10:15 AM Kelley Whitman MD [address] [place] [address]

## 2018-02-16 ENCOUNTER — HOSPITAL ENCOUNTER (OUTPATIENT)
Dept: PHYSICAL THERAPY | Age: 73
Discharge: HOME | End: 2018-02-16
Payer: MEDICARE

## 2018-02-16 PROCEDURE — 97140 MANUAL THERAPY 1/> REGIONS: CPT

## 2018-02-16 PROCEDURE — 97110 THERAPEUTIC EXERCISES: CPT

## 2018-02-16 NOTE — PROGRESS NOTES
PHYSICAL THERAPY - DAILY TREATMENT NOTE    Patient Name: Debbie Grissom        Date: 2018  : 1945    Patient  Verified: YES  Visit #:   15   16  Insurance: Payor: Ryan Hart / Plan: [place] MEDICARE PART A & B / Product Type: Medicare /      In time: 8:25 Out time: 9:15   Total Treatment Time: 50     Medicare Time Tracking (below)   Total Timed Codes (min):  40 1:1 Treatment Time: 40     TREATMENT AREA/ DIAGNOSIS = Left hip pain [M25.552]    SUBJECTIVE  Pain Level (on 0 to 10 scale):  2 10   Medication Changes/New allergies or changes in medical history, any new surgeries or procedures? NO    If yes, update Summary List   Subjective Functional Status/Changes:  []  No changes reported     \"It feels about the same, maybe a little worse today. My appointment with Dr. Iesha Cruz got moved from Tuesday next week to Thursday. \"       OBJECTIVE  Modalities Rationale:     decrease edema, decrease inflammation and decrease pain to improve patient's ability to perform ADLs without pain   min [] Estim, type/location:                                      []  att     []  unatt     []  w/US     []  w/ice    []  w/heat    min []  Mechanical Traction: type/lbs                   []  pro   []  sup   []  int   []  cont    []  before manual    []  after manual    min []  Ultrasound, settings/location:      min []  Iontophoresis w/ dexamethasone, location:                                               []  take home patch       []  in clinic   10 min [x]  Ice     []  Heat    location/position: R SL to L Hip    min []  Vasopneumatic Device, press/temp:     min []  Other:    [x] Skin assessment post-treatment (if applicable):    [x]  intact    []  redness- no adverse reaction     []redness  adverse reaction:        10 min Manual Therapy: Manual hip flex st, piriformis release and hip ER/IR PROM   Rationale:      decrease pain, increase ROM and increase tissue extensibility to improve patient's ability to perform ADLs without pain    30 min Therapeutic Exercise:  [x]  See flow sheet   Rationale:      increase ROM and increase strength to improve the patients ability to perform ADLs without pain     X min Patient Education:  Yes    [x] Reviewed HEP   []  Progressed/Changed HEP based on: Other Objective/Functional Measures:    Mild tenderness around his L greater trochanter     Post Treatment Pain Level (on 0 to 10) scale:   0  / 10     ASSESSMENT  Assessment/Changes in Function:     Reports 20% overall improvement with PT but cont increase in pain with walking > 5 minutes. Pt to see MD next week. []  See Progress Note/Recertification   Patient will continue to benefit from skilled PT services to modify and progress therapeutic interventions, address functional mobility deficits, address ROM deficits, address strength deficits, analyze and address soft tissue restrictions, analyze and cue movement patterns, analyze and modify body mechanics/ergonomics and assess and modify postural abnormalities to attain remaining goals.    Progress toward goals / Updated goals:    Less pain in general     PLAN  [x]  Upgrade activities as tolerated YES Continue plan of care   []  Discharge due to :    []  Other:      Therapist: Devin Iverson PTA    Date: 2/16/2018 Time: 10:10 AM        Future Appointments  Date Time Provider Jessie Triana   2/16/2018 8:30 AM Jordan LIRA PTA RE[facility] AT [facility]   9/10/2018 10:15 AM Sasha Jeff MD [address]

## 2018-03-29 NOTE — PROGRESS NOTES
[address] PHYSICAL THERAPY  [address] [place] [address], [place] [address],[facility], [facility] - Phone: [phone]  Fax: [phone] FOR PHYSICAL THERAPY          Patient Name: Ariel Banuelos : 1945   Treatment/Medical Diagnosis: Left hip pain [M25.552]   Onset Date: Chronic    Referral Source: Mio Murillo MD [facility] [place]): 17   Prior Hospitalization: See Medical History Provider #: [id number]   Prior Level of Function: Limited with prolonged ambulation and ADL activities due to hx of (L) hip pain    Comorbidities: Hx of Cs/Ls Surgery, Tobacco Use, Hx of Prostate Disease     Medications: Verified on Patient Summary List   Visits from [facility]: 13 Missed Visits: 2     Goal/Measure of Progress Goal Met? 1. Pt will be independent and compliant with HEP to decrease pain, increase ROM and return pt to PLOF.     Status at last Eval: Established Current Status: No no   2. Pt will note pain less than or equal to 5/10 at worst to allow increase in functional abilities. Status at last Eval: 8/10 Current Status: 8/10 no   3. Pt will demonstrates ability to perform ADLs without increase in s/s approx 50% of time to return to PLOF   Status at last Eval: N/A Current Status: no no   4. Increase score on FOTO by > or = to 10 points to demo an increase in functional activity tolerance with the LE. Status at last Eval: 40 Current Status: 58  (18) yes       Key Functional Changes/Progress: Pt saw minimal improvement with PT sessions (pt reporting 15%). Symptoms increased with walking/standing > 5 minutes and symptoms improved with rest and heat. Pt has not been seen in the clinic since 18 and at that time he stated he would be seeing Dr. Mahogany Walker 18. G-Codes (GP): [id number] Goal  CK= 40-59%   Goal  CK= 40-59%.  The severity rating is based on the FOTO Score.    Assessments/Recommendations: Discontinue therapy due to lack of attendance or compliance. If you have any questions/comments please contact us directly at [phone]. Thank you for allowing us to assist in the care of your patient.     Therapist Signature: Jr Choe PTA Date: 3/29/18   Reporting Period: 1/22/18-2/16/18 Time: 2:24 PM

## 2019-09-26 PROBLEM — M50.90 CERVICAL DISC DISEASE: Status: ACTIVE | Noted: 2019-09-26

## 2019-11-13 ENCOUNTER — HOSPITAL ENCOUNTER (OUTPATIENT)
Dept: PHYSICAL THERAPY | Age: 74
Discharge: HOME | End: 2019-11-13
Payer: MEDICARE

## 2019-11-13 PROCEDURE — 97162 PT EVAL MOD COMPLEX 30 MIN: CPT

## 2019-11-13 PROCEDURE — 97110 THERAPEUTIC EXERCISES: CPT

## 2019-11-13 NOTE — PROGRESS NOTES
[address]   PHYSICAL THERAPY AT [facility] [address] [place], [address] [facility] [address]  Phone: [phone]  Fax: [phone] / [address]  Patient Name: Malini Daley : 1945   Medical   Diagnosis: Pain in left hand [M79.642] Treatment Diagnosis: L UE weakness   Onset Date: 19     Referral Source: Christiano Morales MD [place] [facility]): 2019   Prior Hospitalization: See medical history Provider #: [id number]   Prior Level of Function: No functional limitations   Comorbidities: Surgery C7, C6, CA   Medications: Verified on Patient Summary List   The Plan of Care and following information is based on the information from the initial evaluation.   ==================================================================================  Assessment / key information:  Patient is a 66 y.o. male who presents to [facility] with Dx of L C8 radiculopathy. Patient reports initial onset of sx on 19 following surgery to C7. Robby Luna Patient states he has not had use of his three fingers since the surgery with little to no improvement. States he can't really place weight on his left wrist without increased aching. No recent MRI's since surgery. Reports increased left UE weakness in shoulder and elbow and increased difficulty buttoning pants, tying shoes, buttoning shirt. Patient reports sx are constant  in nature. C/o increased coldness in L hand. N&T in tips of al fingers. Upon objective evaluation patient demonstrates FHP/rounded shoulders. C/S ROM WFL with exception of bilateral rotation (35 deg), C/S cleared; testing no result on distal UE symptoms. Shoulder AROM WNL. MMT shoulder 4-4+/5 throughout with definite cogwheel resistance noted. Elbow AROM WNL w/ exception of Active supination 60 deg/90 deg PROM, MMT 5/5 biceps, 4/5 triceps, 4-/5 pro/sup.   Wrist A/PROM: Flex: 30/60, Ext 35/60, MMT 3-/5 all planes. Hand: Full PROM, AROM limited in all planes. MMT: Finger extension 1/5, finger flexion 3-/5, thumb 3-/5 all planes, intrinsics 3-/5, good tendon gliding but with notable loss of intrinsic structure of the left hand. Measured for resting night splint (10cm 2nd metacarpal to 5th) to preserve and maintain ROM of the wrist and fingers. Neuro exam reveals diminished to trace DTR's at C7 and C8 om left, diminished sharp/dull sensation bilaterally, coordination intact but lacks quality of movement in left UE. Patient can benefit from PT interventions to improve ROM, strength, dexterity, decrease pain, to facilitate ADLs & overall functional status.   ==================================================================================  Eval Complexity: History HIGH Complexity :3+ comorbidities / personal factors will impact the outcome/ POC ;  Examination  HIGH Complexity : 4+ Standardized tests and measures addressing body structure, function, activity limitation and / or participation in recreation ; Presentation MEDIUM Complexity : Evolving with changing characteristics ; Decision Making MEDIUM Complexity : FOTO score of 26-74; Overall Complexity MEDIUM  Problem List: pain affecting function, decrease ROM, decrease strength, decrease ADL/ functional abilitiies, decrease activity tolerance, decrease flexibility/ joint mobility and other FOTO 63/100   Treatment Plan may include any combination of the following: Therapeutic exercise, Therapeutic activities, Neuromuscular re-education, Physical agent/modality, Manual therapy, Patient education and Functional mobility training  Patient / Family readiness to learn indicated by: asking questions, trying to perform skills and interest  Persons(s) to be included in education: patient (P)  Barriers to Learning/Limitations: None  Measures taken:    Patient Goal (s):  \"Increase use of fingers\"   Patient self reported health status: good  Rehabilitation Potential: good   Short Term Goals: To be accomplished in  3  weeks:  1) Patient to be independent and compliant with initial HEP to prevent further disability. 2) Patient will increase wrist and elbow strength by 1/2 grade and increase hand strength to allow patient to  pants to button  3) Patient to increase GROC to +1 indicating improved functional mobility and independence.  Long Term Goals: To be accomplished in  6  weeks:  1) Patient to be independent & compliant with progressive HEP in preparation for D/C.  2) Patient will increase elbow and wrist strength by 1 grade to assist with ADL's  3) Patient will increase hand strength to allow patient to button shirt and tie shoes in 50% faster time  4) Patient to increase FOTO to >/=74 indicating improved functional mobility. Frequency / Duration:   Patient to be seen  3  times per week for 6  weeks:  Patient / Caregiver education and instruction: self care, activity modification, brace/ splint application and exercises  G-Codes (GP): n/a  Therapist Signature: Matilda Dalh PT Date: 78/77/9379   Certification Period: 11/13/19 to 2/11/20 Time: 8:25 AM   ===========================================================================================  I certify that the above Physical Therapy Services are being furnished while the patient is under my care. I agree with the treatment plan and certify that this therapy is necessary. Physician Signature:        Date:       Time:     Please sign and return to [facility] or you may fax the signed copy to [phone]. Thank you.

## 2019-11-13 NOTE — PROGRESS NOTES
PHYSICAL THERAPY - DAILY TREATMENT NOTE     Patient Name: Brii Ortega        Date: 2019  : 1945   YES Patient  Verified  Visit #:     Insurance: Payor: Alta Gal / Plan: [place] MEDICARE PART A & B / Product Type: Medicare /      In time:  Out time: 905   Total Treatment Time: 75     Medicare/BCBS Time Tracking (below)   Total Timed Codes (min):  75 1:1 Treatment Time:  75     TREATMENT AREA =  Pain in left hand [M79.642]    SUBJECTIVE    Pain Level (on 0 to 10 scale):  0  / 10   Medication Changes/New allergies or changes in medical history, any new surgeries or procedures? NO    If yes, update Summary List   Subjective Functional Status/Changes:  []  No changes reported       See POC         OBJECTIVE    30 min Therapeutic Exercise:  [x]  See flow sheet   Rationale:      increase ROM and increase strength to improve the patients ability to perform ADL's     Billed With/As:   [x] TE   [] TA   [] Neuro   [] Self Care Patient Education: [x] Review HEP    [] Progressed/Changed HEP based on:   [] positioning   [] body mechanics   [] transfers   [] heat/ice application    [] other:        Other Objective/Functional Measures:    See POC  See TE Flow sheet     Post Treatment Pain Level (on 0 to 10) scale:   0  / 10     ASSESSMENT    Assessment/Changes in Function:     See POC     []  See Progress Note/Recertification   Patient will continue to benefit from skilled PT services to modify and progress therapeutic interventions, address functional mobility deficits, address ROM deficits, address strength deficits, analyze and address soft tissue restrictions, analyze and cue movement patterns, analyze and modify body mechanics/ergonomics and assess and modify postural abnormalities to attain remaining goals.       Progress toward goals / Updated goals:    See POC     PLAN    [x]  Upgrade activities as tolerated YES Continue plan of care   []  Discharge due to :    []  Other:      Therapist: Jorge Alberto Trujillo, PT    Date: 11/13/2019 Time: 8:25 AM     Future Appointments   Date Time Provider Jessie Dobsoni   11/13/2019 11:30 AM Soni Bernard, PT REH[facility] CENTER AT [facility]   1/10/2020  1:15 PM Given, Mario Doyle MD [address]

## 2019-11-20 ENCOUNTER — HOSPITAL ENCOUNTER (OUTPATIENT)
Dept: PHYSICAL THERAPY | Age: 74
Discharge: HOME | End: 2019-11-20
Payer: MEDICARE

## 2019-11-20 PROCEDURE — 97140 MANUAL THERAPY 1/> REGIONS: CPT

## 2019-11-20 PROCEDURE — 97530 THERAPEUTIC ACTIVITIES: CPT

## 2019-11-20 PROCEDURE — 97110 THERAPEUTIC EXERCISES: CPT

## 2019-11-20 NOTE — PROGRESS NOTES
PHYSICAL THERAPY - DAILY TREATMENT NOTE     Patient Name: Lizbeth Sam        Date: 2019  : 1945   YES Patient  Verified  Visit #:     Insurance: Payor: Tami Cones / Plan: [place] MEDICARE PART A & B / Product Type: Medicare /      In time: 900 Out time: 950   Total Treatment Time: 50     Medicare/BCBS Time Tracking (below)   Total Timed Codes (min):  50 1:1 Treatment Time:  50     TREATMENT AREA =  Pain in left hand [M79.642]    SUBJECTIVE    Pain Level (on 0 to 10 scale):  0  / 10   Medication Changes/New allergies or changes in medical history, any new surgeries or procedures? NO    If yes, update Summary List   Subjective Functional Status/Changes:  []  No changes reported       Functional improvements: \"I still have very little function in the hand. I did the exercises a little while on my trip. \"  Functional impairments: Decreased AROM in left UE         OBJECTIVE  25 min Therapeutic Exercise:  [x]  See flow sheet   Rationale:      increase ROM and increase strength to improve the patients ability to perform ADL's     15 min Therapeutic Activity: [x]  See flow sheet   Rationale:      increase strength and improve coordination to improve the patients ability to utilize fine motor skills w/ ADL's     10 min Manual Therapy: Technique:      [] S/DTM []IASTM []PROM [x] Passive Stretching   []manual TPR    [x]Jt manipulation:Gr I [] II []  III [x] IV[] V[]  Treatment Area:   Mobs to carpals and wrist, mobs to radial head followed by PROM into supination w/ active hold   Rationale:      increase ROM to improve patient's ability to perform ADL's w/ full AROM    Billed With/As:   [x] TE   [x] TA   [] Neuro   [] Self Care Patient Education: [x] Review HEP    [] Progressed/Changed HEP based on:   [] positioning   [] body mechanics   [] transfers   [] heat/ice application    [] other:        Other Objective/Functional Measures:    TA: picking up beads with left hand and stringing onto pipe , pinching small puff balls w/ hold using first 3 digits, clothes pin pinch and hold first 3 digits , pinch wash cloth. Began wrist, elbow and shoulder exercises. Post Treatment Pain Level (on 0 to 10) scale:   0  / 10     ASSESSMENT    Assessment/Changes in Function:     Patient has a baseline tremor that he has been treated for for 10 years that affects fine motor activities but improves with mobility and repetition. Requires some assist to begin fine motor tasks secondary to tremor. Tolerated new activities well w/ noted difficulty 4th and 5th digits. Lacks full elbow extension and supination. []  See Progress Note/Recertification   Patient will continue to benefit from skilled PT services to modify and progress therapeutic interventions, address functional mobility deficits, address ROM deficits, address strength deficits, analyze and address soft tissue restrictions, analyze and cue movement patterns, analyze and modify body mechanics/ergonomics and assess and modify postural abnormalities to attain remaining goals.       Progress toward goals / Updated goals:    Progressing towards newly established goals     PLAN    [x]  Upgrade activities as tolerated YES Continue plan of care   []  Discharge due to :    []  Other:      Therapist: Karon Richard PT    Date: 11/20/2019 Time: 8:18 AM     Future Appointments   Date Time Provider Jessie Triana   11/20/2019  9:00 AM Jacinto Shields PT [facility]   11/22/2019  9:00 AM Jesús Diana PT [facility] AT [facility]   11/26/2019 11:30 AM Senora Sink [facility]   11/27/2019  9:30 AM Ann Scott PT [facility] AT [facility]   12/3/2019  9:30 AM Jesús Diana PT [facility] AT [facility]   12/4/2019  9:30 AM Jacinto Shields PT [facility]   12/6/2019  9:30 AM Jacinto Shields PT [facility]   12/10/2019  9:30 AM Jesús Diana PT [facility] AT [facility]   12/11/2019  9:30 AM Jacinto Shields PT [facility]   12/13/2019  9:30 AM Oscar Palomo PT [facility] AT [facility]   12/17/2019 9:30 AM Tata Lang RE[facility] AT [facility]   12/18/2019  9:30 AM Nereida Quiñones, PT REH[facility]ENTER AT [facility]   12/20/2019  9:30 AM Edison Frankel, PT [facility] AT [facility]   12/31/2019  9:30 AM Reanna Quinn, PT [facility] AT [facility]   1/10/2020  1:15 PM Given, Amarjit Garrido MD [id number]

## 2019-11-22 ENCOUNTER — HOSPITAL ENCOUNTER (OUTPATIENT)
Dept: PHYSICAL THERAPY | Age: 74
Discharge: HOME | End: 2019-11-22
Payer: MEDICARE

## 2019-11-22 PROCEDURE — 97140 MANUAL THERAPY 1/> REGIONS: CPT

## 2019-11-22 PROCEDURE — 97110 THERAPEUTIC EXERCISES: CPT

## 2019-11-22 NOTE — PROGRESS NOTES
PHYSICAL THERAPY - DAILY TREATMENT NOTE    Patient Name: Mae Johnston        Date: 2019  : 1945    Patient  Verified: YES  Visit #:   3   of   12  Insurance: Payor: Luann Hind / Plan: [place] MEDICARE PART A & B / Product Type: Medicare /      In time: 9 Out time: 9:50   Total Treatment Time: 50     Medicare/BCBS Time Tracking (below)   Total Timed Codes (min):  50 1:1 Treatment Time:  30     TREATMENT AREA/ DIAGNOSIS = Pain in left hand [M79.642]    SUBJECTIVE  Pain Level (on 0 to 10 scale):  0  / 10   Medication Changes/New allergies or changes in medical history, any new surgeries or procedures?     NO    If yes, update Summary List   Subjective Functional Status/Changes:  []  No changes reported     Functional improvement no pain,    Functional limitation I wake up with my hand in a claw        OBJECTIVE      15 min Manual Therapy: PROM to wrist, carpals and fingers   Rationale:      increase ROM and increase tissue extensibility to improve patient's ability to perform ADLs without pain    35 min Therapeutic Exercise:  [x]  See flow sheet   Rationale:      increase ROM and increase strength to improve the patients ability to perform ADLs without pain     Billed With/As:   [x] TE   [] TA   [] Neuro   [] Self Care Patient Education: [x] Review HEP    [] Progressed/Changed HEP based on:   [] positioning   [] body mechanics   [] transfers   [] heat/ice application    [] other:        Other Objective/Functional Measures:    No active finger extension on any hand  Numb and decreased motor control in 3rd thru 5th digits   Post Treatment Pain Level (on 0 to 10) scale:   0  / 10     ASSESSMENT  Assessment/Changes in Function:     Pt with good HEP compliance     []  See Progress Note/Recertification   Patient will continue to benefit from skilled PT services to modify and progress therapeutic interventions, address functional mobility deficits, address ROM deficits, address strength deficits, analyze and address soft tissue restrictions, analyze and cue movement patterns, analyze and modify body mechanics/ergonomics and assess and modify postural abnormalities to attain remaining goals.    Progress toward goals / Updated goals:    Good HEP compliance as evidenced by knowledge of proper HEP      PLAN  [x]  Upgrade activities as tolerated YES Continue plan of care   []  Discharge due to :    []  Other:      Therapist: Boyd Joel PT    Date: 11/22/2019 Time: 10:09 AM     Future Appointments   Date Time Provider Jessie Triana   11/26/2019 11:30 AM Susie Braga [facility]   11/27/2019  9:30 AM Chito Medellin, PT RE[facility] AT [facility]   12/3/2019  9:30 AM Kleber Pelletier, PT [facility] AT [facility]   12/4/2019  9:30 AM Khadra Cowart, PT [facility] AT [facility]   12/6/2019  9:30 AM Khadra Cowart, PT [facility] AT [facility]   12/10/2019  9:30 AM Kleber Pelletier, PT [facility] AT [facility]   12/11/2019  9:30 AM Khadra Cowart, PT [facility] AT [facility]   12/13/2019  9:30 AM Shyanne Cabrera, PT [facility] AT [facility]   12/17/2019  9:30 AM Kleber Pelletier, PT [facility] AT [facility]   12/18/2019  9:30 AM Khadra Cowart, PT [facility] AT [facility]   12/20/2019  9:30 AM Shyanne Cabrera, PT [facility] AT [facility]   12/31/2019  9:30 AM Kleber Pelletier, PT [facility] AT [facility]   1/10/2020  1:15 PM Randy Singh MD [id number]

## 2019-11-26 ENCOUNTER — HOSPITAL ENCOUNTER (OUTPATIENT)
Dept: PHYSICAL THERAPY | Age: 74
Discharge: HOME | End: 2019-11-26
Payer: MEDICARE

## 2019-11-26 PROCEDURE — 97110 THERAPEUTIC EXERCISES: CPT

## 2019-11-26 PROCEDURE — 97530 THERAPEUTIC ACTIVITIES: CPT

## 2019-11-26 NOTE — PROGRESS NOTES
PHYSICAL THERAPY - DAILY TREATMENT NOTE    Patient Name: Annabelle Serrano        Date: 2019  : 1945    Patient  Verified: YES  Visit #:     Insurance: Payor: Aislinn Tatum / Plan: [place] MEDICARE PART A & B / Product Type: Medicare /      In time: 11:30 Out time: 12:15   Total Treatment Time: 45     Medicare Time Tracking (below)   Total Timed Codes (min):  45 1:1 Treatment Time:  45     TREATMENT AREA/ DIAGNOSIS = Pain in left hand [M79.642]    SUBJECTIVE  Pain Level (on 0 to 10 scale):  0  / 10   Medication Changes/New allergies or changes in medical history, any new surgeries or procedures? NO    If yes, update Summary List   Subjective Functional Status/Changes:  []  No changes reported     Pt reports that he is able to use his middle finger more often. Pt states that he cant really use his 4th and 5th digits for ADLs.        OBJECTIVE    30 min Therapeutic Exercise:  [x]  See flow sheet   Rationale:      increase strength and improve coordination to improve the patients ability to perform ADLs without pain     15 min Therapeutic Activity: [x]  See flow sheet   Rationale:    increase strength and improve coordination to improve the patients ability to perform ADLs without pain    Billed With/As:   [x] TE   [] TA   [] Neuro   [] Self Care Patient Education: [x] Review HEP    [] Progressed/Changed HEP based on:   [] positioning   [] body mechanics   [] transfers   [] heat/ice application    [] other:        Other Objective/Functional Measures:    Pt had no pain during treatment session  No AROM finger extension   Post Treatment Pain Level (on 0 to 10) scale:   0  / 10     ASSESSMENT  Assessment/Changes in Function:     Pt has difficulty avoiding excessive finger extension when perform dexterity activities     []  See Progress Note/Recertification   Patient will continue to benefit from skilled PT services to modify and progress therapeutic interventions, address functional mobility deficits, address strength deficits, analyze and address soft tissue restrictions and analyze and cue movement patterns to attain remaining goals.    Progress toward goals / Updated goals:    Slow Progress to    [] STG    [x] LTG  3 as shown by little change in functional activities      PLAN  [x]  Upgrade activities as tolerated YES Continue plan of care   []  Discharge due to :    []  Other:      Therapist: Derik Burkett DPT     Date: 11/26/2019 Time: 11:40 AM        Future Appointments   Date Time Provider Jessie Triana   11/27/2019  9:30 AM Nakita Fields, PT [facility] AT [facility]   12/3/2019  9:30 AM Claudette Bunting, PT [facility] AT [facility]   12/4/2019  9:30 AM Frederick Lon, PT [facility] AT [facility]   12/6/2019  9:30 AM Frederick Lon, PT [facility] AT [facility]   12/10/2019  9:30 AM Claudette Bunting, PT [facility]   12/11/2019  9:30 AM Frederick Lon, PT [facility]   12/13/2019  9:30 AM Gurwinder Rod, PT [facility] AT [facility]   12/17/2019  9:30 AM Claudette Bunting, PT [facility]   12/18/2019  9:30 AM Frederick Lon, PT [facility]   12/20/2019  9:30 AM Gurwinder Colónagensen, PT [facility] AT [facility]   12/31/2019  9:30 AM Claudette Bunting, PT [facility] AT [facility]   1/10/2020  1:15 PM Samantha, Yesenia Preston MD [address]

## 2019-11-27 ENCOUNTER — HOSPITAL ENCOUNTER (OUTPATIENT)
Dept: PHYSICAL THERAPY | Age: 74
Discharge: HOME | End: 2019-11-27
Payer: MEDICARE

## 2019-11-27 PROCEDURE — 97110 THERAPEUTIC EXERCISES: CPT

## 2019-11-27 PROCEDURE — 97530 THERAPEUTIC ACTIVITIES: CPT

## 2019-11-27 PROCEDURE — 97140 MANUAL THERAPY 1/> REGIONS: CPT

## 2019-11-27 NOTE — PROGRESS NOTES
PHYSICAL THERAPY - DAILY TREATMENT NOTE    Patient Name: Indy Contreras        Date: 2019  : 1945    Patient  Verified: YES  Visit #:     Insurance: Payor: Alma Imer / Plan: [place] MEDICARE PART A & B / Product Type: Medicare /      In time: 248 Out time: 1421   Total Treatment Time: 55     Medicare Time Tracking (below)   Total Timed Codes (min):  55 1:1 Treatment Time: 55     TREATMENT AREA/ DIAGNOSIS = Pain in left hand [M79.642]    SUBJECTIVE  Pain Level (on 0 to 10 scale):  0  / 10   Medication Changes/New allergies or changes in medical history, any new surgeries or procedures? NO    If yes, update Summary List   Subjective Functional Status/Changes:  []  No changes reported     Pt reports that he is able to use his 3rd finger more functionally than prior. Pt states that he is still unable to perform  4th and 5th digits for ADLs. OBJECTIVE     15 min Therapeutic Activity:  [x]  See flow sheet   Rationale:      increase strength and improve UE positioning, mechanics to improve the patients ability to perform sleep, grooming, dressing without pain       25 min Therapeutic Exercise: [x]  See flow sheet   Rationale:      increase strength and improve coordination to improve the patients ability to perform ADLs without pain     15 min Manual Therapy Technique:      []? S/DTM []? IASTM []? PROM [x]? Passive Stretching   []?manual TPR    [x]? Jt manipulation:Gr I []? II []? III [x]? IV[]? V[]?   Treatment Area:  STM to L forearm extensors, flexors f/b mobs to carpals and distal RU joint, f/b PROM into supination   Rationale:    increase ROM to improve the patients ability to perform ADLs without pain    Billed With/As:   [x] TE   [] TA   [] Neuro   [] Self Care Patient Education: [x] Review HEP    [] Progressed/Changed HEP based on:   [] positioning   [] body mechanics   [] transfers   [] heat/ice application    [] other:        Other Objective/Functional Measures:    Pt issued yellow theraputty and ed on use for HEP. He demonstrated ability to perform  strengthening, putty rolls, and pulls with 1st-5th digit. Added shoulder PROM, AAROM secondary to reduced shoulder ROM in seated, standing with ADLs per pt. Post Treatment Pain Level (on 0 to 10) scale:   0  / 10     ASSESSMENT  Assessment/Changes in Function:     Pt with good response to addition of theraputty. []  See Progress Note/Recertification   Patient will continue to benefit from skilled PT services to modify and progress therapeutic interventions, address functional mobility deficits, address strength deficits, analyze and address soft tissue restrictions and analyze and cue movement patterns to attain remaining goals. Progress toward goals / Updated goals:    Slow Progress to    [] STG    [x] LTG  3 as shown by slow progression of finger ext in 4th and 5th digits.      PLAN  [x]  Upgrade activities as tolerated YES Continue plan of care   []  Discharge due to :    []  Other:      Therapist: Prabhu Haro, PT ,DPT     Date: 11/27/2019 Time: 9 AM        Future Appointments   Date Time Provider Jessie Triana   11/27/2019  9:30 AM Tona Romero, PT [facility] AT [facility]   12/3/2019  9:30 AM Timoteo Syed, PT [facility] AT [facility]   12/4/2019  9:30 AM Martha Robert PT [facility] AT [facility]   12/6/2019  9:30 AM Martha Robert, PT [facility] AT [facility]   12/10/2019  9:30 AM Timoteo Syed PT [facility] AT [facility]   12/11/2019  9:30 AM Martha Robert, PT [facility]   12/13/2019  9:30 AM Tg Mcgraw PT [facility] AT [facility]   12/17/2019  9:30 AM Timoteo Syed, PT [facility] AT [facility]   12/18/2019  9:30 AM Martha Robert PT [facility]   12/20/2019  9:30 AM Tg Mcgraw, PT [facility] AT [facility]   12/31/2019  9:30 AM Timoteo Syed, PT [facility] AT [facility]   1/10/2020  1:15 PM Given, Staci Giron MD [address]

## 2019-12-03 ENCOUNTER — HOSPITAL ENCOUNTER (OUTPATIENT)
Dept: PHYSICAL THERAPY | Age: 74
Discharge: HOME | End: 2019-12-03
Payer: MEDICARE

## 2019-12-03 PROCEDURE — 97110 THERAPEUTIC EXERCISES: CPT

## 2019-12-03 PROCEDURE — 97140 MANUAL THERAPY 1/> REGIONS: CPT

## 2019-12-03 NOTE — PROGRESS NOTES
PHYSICAL THERAPY - DAILY TREATMENT NOTE    Patient Name: Lizbeth Sam        Date: 12/3/2019  : 1945    Patient  Verified: YES  Visit #:     Insurance: Payor: Tami Cones / Plan: [place] MEDICARE PART A & B / Product Type: Medicare /      In time: 9:25 Out time: 10:05   Total Treatment Time: 40     Medicare/BCBS Time Tracking (below)   Total Timed Codes (min):  40 1:1 Treatment Time:  40     TREATMENT AREA/ DIAGNOSIS = Pain in left hand [M79.642]    SUBJECTIVE  Pain Level (on 0 to 10 scale):  0  / 10   Medication Changes/New allergies or changes in medical history, any new surgeries or procedures?     NO    If yes, update Summary List   Subjective Functional Status/Changes:  []  No changes reported     Functional improvement no pain   Functional limitation I cant extend my fingers        OBJECTIVE      8 min Manual Therapy: PROM to fingers, carpals, and wrist (ext and supination)   Rationale:      increase ROM and increase tissue extensibility to improve patient's ability to perform ADLs without pain    32 min Therapeutic Exercise:  [x]  See flow sheet   Rationale:      increase ROM and increase strength to improve the patients ability to perform ADLs without pain     Billed With/As:   [x] TE   [] TA   [] Neuro   [] Self Care Patient Education: [x] Review HEP    [] Progressed/Changed HEP based on:   [] positioning   [] body mechanics   [] transfers   [] heat/ice application    [] other:        Other Objective/Functional Measures:    Pt able to make a fist and flex fingers, but unable to extend PIP/DIP/MP joints  Weak ,   Unable to oppose thumb to 4th and 5th digits, L   Post Treatment Pain Level (on 0 to 10) scale:   0  / 10     ASSESSMENT  Assessment/Changes in Function:     Pt with improving strength  Good HEp compliance     []  See Progress Note/Recertification   Patient will continue to benefit from skilled PT services to modify and progress therapeutic interventions, address functional mobility deficits, address ROM deficits, address strength deficits, analyze and address soft tissue restrictions, analyze and cue movement patterns, analyze and modify body mechanics/ergonomics and assess and modify postural abnormalities to attain remaining goals.    Progress toward goals / Updated goals:    Low pain levels  learning a good HEP     PLAN  [x]  Upgrade activities as tolerated YES Continue plan of care   []  Discharge due to :    []  Other:      Therapist: Noble Thurman PT    Date: 12/3/2019 Time: 10:47 AM     Future Appointments   Date Time Provider Jessie Triana   12/4/2019  9:30 AM Lisa Mcfarlane, PT [facility] AT [facility]   12/6/2019  9:30 AM Lisa Mcfarlane, PT [facility] AT [facility]   12/10/2019  9:30 AM Miller Sher, PT [facility] AT [facility]   12/11/2019  9:30 AM Lisa Mcfarlane PT [facility]   12/13/2019  9:30 AM Mara Ponce, PT [facility] AT [facility]   12/17/2019  9:30 AM Miller Sher, PT [facility] AT [facility]   12/18/2019  9:30 AM Lisa Mcfarlane PT [facility]   12/20/2019  9:30 AM Mara Ponce PT [facility] AT [facility]   12/31/2019  9:30 AM Miller Sher PT [facility] AT [facility]   1/10/2020  1:15 PM Shanelle Singh MD [address]

## 2019-12-04 ENCOUNTER — HOSPITAL ENCOUNTER (OUTPATIENT)
Dept: PHYSICAL THERAPY | Age: 74
Discharge: HOME | End: 2019-12-04
Payer: MEDICARE

## 2019-12-04 PROCEDURE — 97140 MANUAL THERAPY 1/> REGIONS: CPT

## 2019-12-04 PROCEDURE — 97032 APPL MODALITY 1+ESTIM EA 15: CPT

## 2019-12-04 PROCEDURE — 97110 THERAPEUTIC EXERCISES: CPT

## 2019-12-04 NOTE — PROGRESS NOTES
PHYSICAL THERAPY - DAILY TREATMENT NOTE     Patient Name: Carlo Mcneil        Date: 2019  : 1945   YES Patient  Verified  Visit #:     Insurance: Payor: Martell Alvarez / Plan: [facility] MEDICARE PART A & B / Product Type: Medicare /      In time: 900 Out time: 1010   Total Treatment Time: 70     Medicare/BCBS Time Tracking (below)   Total Timed Codes (min):  70 1:1 Treatment Time:  60     TREATMENT AREA =  Pain in left hand [M79.642]    SUBJECTIVE    Pain Level (on 0 to 10 scale):  0  / 10   Medication Changes/New allergies or changes in medical history, any new surgeries or procedures? NO    If yes, update Summary List   Subjective Functional Status/Changes:  []  No changes reported       Functional improvements: Patient reports increased pain upon waking lasting 15-20 minutes which has worsened over the past week and is not dependent upon night splint. Does report increased use of middle finger and getting his hand into his back poscket.   Functional impairments: Decreased strength and functional mobility of left hand         OBJECTIVE  Modalities Rationale:     increase muscle contraction/control to improve patient's ability to utilize hand function     10 min [x] Estim, type/location: [place] Wrist extensors                                     [x]  att     []  unatt     []  w/US     []  w/ice    []  w/heat    min []  Mechanical Traction: type/lbs                   []  pro   []  sup   []  int   []  cont    []  before manual    []  after manual    min []  Ultrasound, settings/location:      min []  Iontophoresis w/ dexamethasone, location:                                               []  take home patch       []  in clinic   10 min [x]  Ice     [x]  Heat    location/position: CP to wrist and elbow, MH to neck    min []  Vasopneumatic Device, press/temp:     min []  Other:    [x] Skin assessment post-treatment (if applicable):    [x]  intact    [x]  redness- no adverse reaction     []redness  adverse reaction:      35 min Therapeutic Exercise:  [x]  See flow sheet   Rationale:      increase ROM and increase strength to improve the patients ability to use left hand w/ ADL's     15 min Manual Therapy: Technique:      [] S/DTM []IASTM [x]PROM [] Passive Stretching   []manual TPR    []Jt manipulation:Gr I [] II []  III [] IV[] V[]  Treatment Area:  Left hand, wrist ,shoulder   Rationale:      increase ROM to improve patient's ability to perform ADL's    Billed With/As:   [x] TE   [] TA   [] Neuro   [] Self Care Patient Education: [x] Review HEP    [] Progressed/Changed HEP based on:   [] positioning   [] body mechanics   [] transfers   [] heat/ice application    [] other:        Other Objective/Functional Measures:    Threading: take beads off one and thread another using digits 3-5 w/ thumb  Clothes pins to  soft balls     Post Treatment Pain Level (on 0 to 10) scale:   0  / 10     ASSESSMENT    Assessment/Changes in Function:     Increased soreness in wrist and hand with notable fatigue following treatment session. Limited finger extension w/ Estim but able to hold for small amount of time with min assist.  Improved functional mobility in middle finger and patient able to grasp bead during threading w/ fifth digit. []  See Progress Note/Recertification   Patient will continue to benefit from skilled PT services to modify and progress therapeutic interventions, address functional mobility deficits, address ROM deficits, address strength deficits, analyze and address soft tissue restrictions, analyze and cue movement patterns, analyze and modify body mechanics/ergonomics and assess and modify postural abnormalities to attain remaining goals.       Progress toward goals / Updated goals:    Slow progress towards goals     PLAN    [x]  Upgrade activities as tolerated YES Continue plan of care   []  Discharge due to :    []  Other:      Therapist: Matilda Dahl PT    Date: 12/4/2019 Time: 12:45 PM Future Appointments   Date Time Provider Jessie Kamilah   12/6/2019  9:30 AM Ora Wells, PT [facility] AT [facility]   12/10/2019  9:30 AM Bk Johnson, PT [facility] AT [facility]   12/11/2019  9:30 AM Ora Wells, PT [facility]   12/13/2019  9:30 AM Addi Lizama, PT [facility] AT [facility]   12/17/2019  9:30 AM Bk Johnson, PT [facility] AT [facility]   12/18/2019  9:30 AM Ora Wells, PT [facility]   12/20/2019  9:30 AM Addi Lizama, PT [facility]   12/31/2019  9:30 AM Bk Johnson, PT [facility]   1/10/2020  1:15 PM [facility]

## 2019-12-06 ENCOUNTER — HOSPITAL ENCOUNTER (OUTPATIENT)
Dept: PHYSICAL THERAPY | Age: 74
Discharge: HOME | End: 2019-12-06
Payer: MEDICARE

## 2019-12-06 PROCEDURE — 97535 SELF CARE MNGMENT TRAINING: CPT

## 2019-12-06 PROCEDURE — 97140 MANUAL THERAPY 1/> REGIONS: CPT

## 2019-12-06 PROCEDURE — 97032 APPL MODALITY 1+ESTIM EA 15: CPT

## 2019-12-06 PROCEDURE — 97530 THERAPEUTIC ACTIVITIES: CPT

## 2019-12-06 NOTE — PROGRESS NOTES
[address] PHYSICAL THERAPY AT [address], [address] [place] [address] Way, [address], [address] [place] [address]  Phone: [phone]  Fax: [phone] OF [address] [facility] [place]          Patient Name: Jazlyn Linares : 1945   Treatment/Medical Diagnosis: Pain in left hand [M79.642]   Onset Date: 19    Referral Source: Iraj Abbott MD [facility]): 19   Prior Hospitalization: See Medical History Provider #: [id number]   Prior Level of Function: Independent all ADL's   Comorbidities: Surgery C7, C6, CA   Medications: Verified on Patient Summary List   Visits from [facility]: 8 Missed Visits: 0     Goal/Measure of Progress Goal Met? 1. Patient to be independent and compliant with initial HEP to prevent further disability. Status at last Eval: initiated Current Status: Independent with current HEP yes   2. Patient will increase wrist and elbow strength by 1/2 grade and increase hand strength to allow patient to  pants to button   Status at last Eval: 3-/5 all planes wrist  0-2- hand Current Status: unchanged Progressing   3. Patient to increase GROC to +1 indicating improved functional mobility and independence. Status at last Eval: n/a Current Status: See Summary below Progressing     Key Functional Changes/Progress: Mr. Jorge Yepez has made some gains in shoulder and elbow ROM and strength during PT. His gains in functional mobility of the wrist and hand are small. He continues to have little to no active extension of digits 3-5 and some flexion/Abduction. There is notable deformity/structural integrity of the carpals in the left hand with significant atrophy of all intrinsic muscles including thenar eminence. Mobilization of the carpals and wrist improve ROM but only temporarily.   He is very consistent with his HEP and reports he is able to place objects in his back pocket with a little more control but still significant loss of  /hand strength. He has been fitted for a resting splint for night wear as he is complaining of significant pain in the morning due to increased wrist and finger flexion during sleep. Continued diminished DTR's at C7 and C8 on the left, diminished sharp/dull sensation bilaterally. Therapy has included fine and gross motor strengthening of the left upper extremity, fitting for splint, Neuromuscular re education  For activation of the wrist and finger extensor groups, modalities for pain, joint mobilization and PROM for increased mobility. Problem List: pain affecting function, decrease ROM, decrease strength, decrease ADL/ functional abilitiies, decrease activity tolerance and decrease flexibility/ joint mobility   Treatment Plan may include any combination of the following: Therapeutic exercise, Therapeutic activities, Neuromuscular re-education, Physical agent/modality, Manual therapy, Patient education, Self Care training, Functional mobility training and Other: Splint care  Patient Goal(s) has been updated and includes:      Goals for this certification period include and are to be achieved in   4-6  weeks:  1) Patient to be independent & compliant with progressive HEP in preparation for D/C.  2) Patient will increase elbow and wrist strength by 1 grade to assist with ADL's  3) Patient will increase hand strength to allow patient to button shirt and tie shoes in 50% faster time  4) Patient to increase FOTO to >/=74 indicating improved functional mobility. Frequency / Duration:   Patient to be seen   3   times per week for   4-6    weeks:    Assessments/Recommendations: Continue PT per initial POC to achieve all LTG's  If you have any questions/comments please contact us directly at [phone]. Thank you for allowing us to assist in the care of your patient.     Therapist Signature: Jose Miguel Segovia PT Date: 80/0/3928   Certification Period:  Reporting Period: 11/13/19 to 2/11/20 12/6/19 to 1/6/20 Time: 1:00 PM   NOTE TO PHYSICIAN:  PLEASE COMPLETE THE ORDERS BELOW AND FAX TO   [facility] at [address]: [phone]. If you are unable to process this request in 24 hours please contact our office: [phone].    ___ I have read the above report and request that my patient continue as recommended.   ___ I have read the above report and request that my patient continue therapy with the following changes/special instructions: ________________________________________________   ___ I have read the above report and request that my patient be discharged from therapy.      Physician Signature:        Date:       Time:

## 2019-12-06 NOTE — PROGRESS NOTES
PHYSICAL THERAPY - DAILY TREATMENT NOTE     Patient Name: Tosha Samayoa        Date: 2019  : 1945   YES Patient  Verified  Visit #:     Insurance: Payor: Alexandre Ford / Plan: [place] MEDICARE PART A & B / Product Type: Medicare /      In time: 930 Out time:    Total Treatment Time: 60     Medicare/BCBS Time Tracking (below)   Total Timed Codes (min):  60 1:1 Treatment Time: 50     TREATMENT AREA =  Pain in left hand [M79.642]    SUBJECTIVE    Pain Level (on 0 to 10 scale):  0  / 10   Medication Changes/New allergies or changes in medical history, any new surgeries or procedures? NO    If yes, update Summary List   Subjective Functional Status/Changes:  []  No changes reported       Functional improvements: \"So I fell asleep without the splint and woke up without pain so I went back to sleep but then woke up an hour later in significant pain that took 20 minutes to go away. I put the splint on and woke up without pain. \"  Functional impairments: Decreased strength and AROM in left hand         OBJECTIVE  Modalities Rationale:     decrease inflammation and decrease pain to improve patient's ability to perform ADL's     10 min [x] Estim, type/location: [place] 10/10 to wrist extensors with A for finger extension                                     [x]  att     []  unatt     []  w/US     []  w/ice    []  w/heat    min []  Mechanical Traction: type/lbs                   []  pro   []  sup   []  int   []  cont    []  before manual    []  after manual    min []  Ultrasound, settings/location:      min []  Iontophoresis w/ dexamethasone, location:                                               []  take home patch       []  in clinic   10 min [x]  Ice     []  Heat    location/position: Supine to left wrist    min []  Vasopneumatic Device, press/temp:     min []  Other:    [x] Skin assessment post-treatment (if applicable):    [x]  intact    [x]  redness- no adverse reaction     []redness  adverse reaction:      15 min Therapeutic Exercise:  [x]  See flow sheet   Rationale:      increase ROM and increase strength to improve the patients ability to perform ADL's     15 min Therapeutic Activity: [x]  See flow sheet   Rationale:      increase ROM, increase strength and improve coordination to improve the patients ability to perform ADL's     10 min Manual Therapy: Technique:      [] S/DTM []IASTM []PROM [x] Passive Stretching   []manual TPR    [x]Jt manipulation:Gr I [] II []  III [x] IV[x] V[]  Treatment Area: Mobilization to carpals and wrist for extension   Rationale:      decrease pain and increase ROM to improve patient's ability to perform ADL's w/ full ROM    Billed With/As:   [x] TE   [x] TA   [] Neuro   [] Self Care Patient Education: [x] Review HEP    [] Progressed/Changed HEP based on:   [] positioning   [] body mechanics   [] transfers   [] heat/ice application    [] other:        Other Objective/Functional Measures:    Adjusted splint and adhered straps for ease of Don/doffing and increase use. Patient given functional activity for home to improve fine motor use with buttons     Post Treatment Pain Level (on 0 to 10) scale:   0  / 10     ASSESSMENT    Assessment/Changes in Function:     See PN     []  See Progress Note/Recertification   Patient will continue to benefit from skilled PT services to modify and progress therapeutic interventions, address functional mobility deficits, address ROM deficits, address strength deficits, analyze and address soft tissue restrictions, analyze and cue movement patterns, analyze and modify body mechanics/ergonomics and assess and modify postural abnormalities to attain remaining goals.       Progress toward goals / Updated goals:    See PN     PLAN    [x]  Upgrade activities as tolerated YES Continue plan of care   []  Discharge due to :    []  Other:      Therapist: Fozia Rascon, PT    Date: 12/6/2019 Time: 8:18 AM     Future Appointments   Date Time Provider Jessie Triana   12/6/2019  9:30 AM Jun Cordero, PT REHAB CENTER AT [facility]   12/10/2019  9:30 AM Sami Conrad, PT REHAB CENTER AT [facility]   12/11/2019  9:30 AM Jun Cordero, PT REHAB CENTER AT [facility]   12/13/2019  9:30 AM Alia Yanez, PT REHAB CENTER AT [facility]   12/17/2019  9:30 AM Sami Conrad, PT REHAB CENTER AT [facility]   12/18/2019  9:30 AM Jun Cordero, PT REHAB CENTER AT [facility]   12/20/2019  9:30 AM Alia Yanez, PT REHAB CENTER AT [facility]   12/31/2019  9:30 AM Sami Conrad, PT REHAB CENTER AT [facility]   1/10/2020  1:15 PM Ander Singh MD [address] [facility]

## 2019-12-09 ENCOUNTER — APPOINTMENT (OUTPATIENT)
Dept: PHYSICAL THERAPY | Age: 74
End: 2019-12-09
Payer: MEDICARE

## 2019-12-10 ENCOUNTER — APPOINTMENT (OUTPATIENT)
Dept: PHYSICAL THERAPY | Age: 74
End: 2019-12-10
Payer: MEDICARE

## 2019-12-11 ENCOUNTER — APPOINTMENT (OUTPATIENT)
Dept: PHYSICAL THERAPY | Age: 74
End: 2019-12-11
Payer: MEDICARE

## 2019-12-13 ENCOUNTER — HOSPITAL ENCOUNTER (OUTPATIENT)
Dept: PHYSICAL THERAPY | Age: 74
Discharge: HOME | End: 2019-12-13
Payer: MEDICARE

## 2019-12-13 PROCEDURE — 97110 THERAPEUTIC EXERCISES: CPT

## 2019-12-13 PROCEDURE — 97140 MANUAL THERAPY 1/> REGIONS: CPT

## 2019-12-13 NOTE — PROGRESS NOTES
PHYSICAL THERAPY - DAILY TREATMENT NOTE      Patient Name: Lizbeth Sam        Date: 2019  : 1945   YES Patient  Verified  Visit #:     Insurance: Payor: Tami Cones / Plan: [place] MEDICARE PART A & B / Product Type: Medicare /      In time: 930 Out time: 2787   Total Treatment Time: 55     Medicare Time Tracking (below)   Total Timed Codes (min):  55 1:1 Treatment Time:  55     TREATMENT AREA =  Pain in left hand [M79.642]    SUBJECTIVE    Pain Level (on 0 to 10 scale):  0  / 10   Medication Changes/New allergies or changes in medical history, any new surgeries or procedures? NO    If yes, update Summary List   Subjective Functional Status/Changes:  []  No changes reported     Reports seeing MD since last session, pt to have an EMG study for (L) UE s/s.  Con't with limited finger extension on (L)        Modalities Rationale:     increase muscle contraction/control to improve patient's ability to increase functional use of (L) hand during ADLs  10 min [x] Estim, type/location: [place] 20bps, 10/30 on:off 5 sec ramp  to (L) finger extensors        min []  Mechanical Traction: type/lbs                   []  pro   []  sup   []  int   []  cont    []  before manual    []  after manual    min []  Ultrasound, settings/location:      min []  Iontophoresis w/ dexamethasone, location:                                               []  take home patch       []  in clinic    min []  Ice     []  Heat    location/position:     min []  Vasopneumatic Device, press/temp:     min []  Other:    [x] Skin assessment post-treatment (if applicable):    [x]  intact    []  redness- no adverse reaction     []redness  adverse reaction:        35 min Therapeutic Exercise:  [x]  See flow sheet   Rationale:      increase ROM and increase strength to improve the patients ability to perform ADLs     10 min Manual Therapy: DTM to post cuff, pec minor,anteiror middle scalene, wrist flexor, AAROM of (L) finger extensors Rationale:      decrease pain, increase ROM, increase tissue extensibility and decrease trigger points to improve patient's ability to increase ADL tolerance         Other Objective/Functional Measures:    Demonstrates limited finger extension at MCP joint. Increase mm guarding in anterior/middle scalene,      Post Treatment Pain Level (on 0 to 10) scale:   0  / 10     ASSESSMENT    Assessment/Changes in Function:     Slow progression with finger extension and use of (L) UE      []  See Progress Note/Recertification   Patient will continue to benefit from skilled PT services to modify and progress therapeutic interventions, address functional mobility deficits, address ROM deficits, address strength deficits and analyze and address soft tissue restrictions to attain remaining goals.       Progress toward goals / Updated goals:    Slow progress with LTGs, will monitor and progress as able      PLAN    []  Upgrade activities as tolerated YES Continue plan of care   []  Discharge due to :    []  Other:      Therapist: Mickie Sandifer, PT    Date: 12/13/2019 Time: 9:46 AM     Future Appointments   Date Time Provider Jessie Triana   12/17/2019  9:30 AM Gerald Danielle PT REHAB CENTER AT [facility]   12/18/2019  9:30 AM Maine Neves PT REHAB CENTER AT [facility]   12/20/2019  9:30 AM Betzy Khan PT REHAB CENTER AT [facility]   12/31/2019  9:30 AM Gerald Danielle PT REHAB CENTER AT [facility]   1/10/2020  1:15 PM Amara Singh MD [address] [facility]

## 2019-12-17 ENCOUNTER — APPOINTMENT (OUTPATIENT)
Dept: PHYSICAL THERAPY | Age: 74
End: 2019-12-17
Payer: MEDICARE

## 2019-12-18 ENCOUNTER — HOSPITAL ENCOUNTER (OUTPATIENT)
Dept: PHYSICAL THERAPY | Age: 74
Discharge: HOME | End: 2019-12-18
Payer: MEDICARE

## 2019-12-18 PROCEDURE — 97110 THERAPEUTIC EXERCISES: CPT

## 2019-12-18 PROCEDURE — 97140 MANUAL THERAPY 1/> REGIONS: CPT

## 2019-12-18 NOTE — PROGRESS NOTES
PHYSICAL THERAPY - DAILY TREATMENT NOTE     Patient Name: Percy Tompkins        Date: 2019  : 1945   YES Patient  Verified  Visit #:   10   of   12  Insurance: Payor: Annabelle Mcwilliams / Plan: [place] MEDICARE PART A & B / Product Type: Medicare /      In time: 930 Out time: 59   Total Treatment Time: 60     Medicare/BCBS Time Tracking (below)   Total Timed Codes (min):  60 1:1 Treatment Time:  40     TREATMENT AREA =  Pain in left hand [M79.642]    SUBJECTIVE    Pain Level (on 0 to 10 scale):  0  / 10   Medication Changes/New allergies or changes in medical history, any new surgeries or procedures?     NO    If yes, update Summary List   Subjective Functional Status/Changes:  []  No changes reported       Functional improvements: Patient is scheduled for EMG on   Functional impairments: Decreased function and mobility of the left hand         OBJECTIVE  Modalities Rationale:    PD   min [] Estim, type/location:                                      []  att     []  unatt     []  w/US     []  w/ice    []  w/heat    min []  Mechanical Traction: type/lbs                   []  pro   []  sup   []  int   []  cont    []  before manual    []  after manual    min []  Ultrasound, settings/location:      min []  Iontophoresis w/ dexamethasone, location:                                               []  take home patch       []  in clinic    min []  Ice     []  Heat    location/position:     min []  Vasopneumatic Device, press/temp:     min []  Other:    [] Skin assessment post-treatment (if applicable):    []  intact    []  redness- no adverse reaction     []redness  adverse reaction:      20/15 min Therapeutic Exercise:  [x]  See flow sheet   Rationale:      increase ROM, increase strength and improve coordination to improve the patients ability to dress and assist w/ ADL's     25 min Manual Therapy: Technique:      [] S/DTM []IASTM [x]PROM [x] Passive Stretching   []manual TPR    [x]Jt manipulation:Gr I [] II [] III [] IV[x] V[]  Treatment Area:  L wrist extension and supination   Rationale:      decrease pain and increase ROM to improve patient's ability to use the left hand with dressing    Billed With/As:   [x] TE   [] TA   [] Neuro   [] Self Care Patient Education: [x] Review HEP    [] Progressed/Changed HEP based on:   [] positioning   [] body mechanics   [] transfers   [] heat/ice application    [] other:        Other Objective/Functional Measures:         Post Treatment Pain Level (on 0 to 10) scale:   0  / 10     ASSESSMENT    Assessment/Changes in Function:     Improved mobility and decreased pain following joint mobilization. Continues to have decreased strength left digits 4-5, decreased  strength overall     []  See Progress Note/Recertification   Patient will continue to benefit from skilled PT services to modify and progress therapeutic interventions, address functional mobility deficits, address ROM deficits, address strength deficits, analyze and address soft tissue restrictions, analyze and cue movement patterns, analyze and modify body mechanics/ergonomics and assess and modify postural abnormalities to attain remaining goals. Progress toward goals / Updated goals:    Slow progress towards goals.   Will progress as able     PLAN    [x]  Upgrade activities as tolerated YES Continue plan of care   []  Discharge due to :    []  Other:      Therapist: Jose Miguel Segovia PT    Date: 12/18/2019 Time: 8:28 AM     Future Appointments   Date Time Provider Jessie Triana   12/18/2019  9:30 AM Michelle Osuna, PT REHAB CENTER AT [facility]   12/20/2019  9:30 AM Shelly Latif PT REHAB CENTER AT [facility]   12/30/2019  8:30 AM Michelle Osuna PT REHAB CENTER AT [facility]

## 2019-12-20 ENCOUNTER — HOSPITAL ENCOUNTER (OUTPATIENT)
Dept: PHYSICAL THERAPY | Age: 74
Discharge: HOME | End: 2019-12-20
Payer: MEDICARE

## 2019-12-20 PROCEDURE — 97110 THERAPEUTIC EXERCISES: CPT

## 2019-12-20 PROCEDURE — 97140 MANUAL THERAPY 1/> REGIONS: CPT

## 2019-12-20 NOTE — PROGRESS NOTES
PHYSICAL THERAPY - DAILY TREATMENT NOTE      Patient Name: Tosha Samayoa        Date: 2019  : 1945   YES Patient  Verified  Visit #:     Insurance: Payor: Alexandre Ford / Plan: [place] MEDICARE PART A & B / Product Type: Medicare /      In time: 296 Out time: 1005   Total Treatment Time: 45     Medicare Time Tracking (below)   Total Timed Codes (min):  45 1:1 Treatment Time:  45     TREATMENT AREA =  Pain in left hand [M79.642]    SUBJECTIVE    Pain Level (on 0 to 10 scale):  0  / 10   Medication Changes/New allergies or changes in medical history, any new surgeries or procedures? NO    If yes, update Summary List   Subjective Functional Status/Changes:  []  No changes reported     Reports no change in s/s. Con't with hand weakness and limited ADL tolerance        Modalities Rationale:      to improve patient's ability to    min [] Estim, type/location:                                      []  att     []  unatt     []  w/US     []  w/ice    []  w/heat    min []  Mechanical Traction: type/lbs                   []  pro   []  sup   []  int   []  cont    []  before manual    []  after manual    min []  Ultrasound, settings/location:      min []  Iontophoresis w/ dexamethasone, location:                                               []  take home patch       []  in clinic    min []  Ice     []  Heat    location/position:     min []  Vasopneumatic Device, press/temp:     min []  Other:    [] Skin assessment post-treatment (if applicable):    []  intact    []  redness- no adverse reaction     []redness  adverse reaction:        35 min Therapeutic Exercise:  [x]  See flow sheet   Rationale:      increase ROM and increase strength to improve the patients ability to increase functional use of (L) UE during ADLs      10 min Manual Therapy: (L) wrist mobs for extension and supination, PROM of (L) wrist extension.  Tactile cues for use of finger extensors, Blocking at MCP joint for AROM finger Extension    Rationale:      decrease pain, increase ROM, increase tissue extensibility and decrease trigger points to improve patient's ability to perform ADLs      Other Objective/Functional Measures:    Demonstrates decrease in instrinsic hand strength and lack of MCP extension without stability at MCP joint provided by therapist. Demonstrates AROM of (L) wrist extension to 10 deg, inability to extend MCP to neutral position. Post Treatment Pain Level (on 0 to 10) scale:   0  / 10     ASSESSMENT    Assessment/Changes in Function:      Slow progress towards goals and functional use of (L) hand. Pt to have EMG study 1 week from today     []  See Progress Note/Recertification   Patient will continue to benefit from skilled PT services to modify and progress therapeutic interventions, address functional mobility deficits, address ROM deficits, address strength deficits, analyze and address soft tissue restrictions and analyze and cue movement patterns to attain remaining goals.       Progress toward goals / Updated goals:    Slow progress with LTGs and strength of (L) hand muscles      PLAN    []  Upgrade activities as tolerated YES Continue plan of care   []  Discharge due to :    []  Other:      Therapist: Fletcher Liu PT    Date: 12/20/2019 Time: 9:42 AM     Future Appointments   Date Time Provider Jessie Triana   12/30/2019  8:30 AM Abida Horton, PT REHAB CENTER AT [facility]

## 2019-12-30 ENCOUNTER — HOSPITAL ENCOUNTER (OUTPATIENT)
Dept: PHYSICAL THERAPY | Age: 74
Discharge: HOME | End: 2019-12-30
Payer: MEDICARE

## 2019-12-30 PROCEDURE — 97140 MANUAL THERAPY 1/> REGIONS: CPT

## 2019-12-30 PROCEDURE — 97530 THERAPEUTIC ACTIVITIES: CPT

## 2019-12-30 PROCEDURE — 97110 THERAPEUTIC EXERCISES: CPT

## 2019-12-30 NOTE — PROGRESS NOTES
PHYSICAL THERAPY - DAILY TREATMENT NOTE     Patient Name: Franki Valle        Date: 2019  : 1945   YES Patient  Verified  Visit #:     Insurance: Payor: Debi Freeman / Plan: [place] MEDICARE PART A & B / Product Type: Medicare /      In time: 900 Out time: 251   Total Treatment Time: 55     Medicare/BCBS Time Tracking (below)   Total Timed Codes (min):  55 1:1 Treatment Time:  45     TREATMENT AREA =  Pain in left hand [M79.102]    SUBJECTIVE    Pain Level (on 0 to 10 scale):  0  / 10   Medication Changes/New allergies or changes in medical history, any new surgeries or procedures?     NO    If yes, update Summary List   Subjective Functional Status/Changes:  []  No changes reported       See Recert       OBJECTIVE  Modalities Rationale:    PD   min [] Estim, type/location:                                      []  att     []  unatt     []  w/US     []  w/ice    []  w/heat    min []  Mechanical Traction: type/lbs                   []  pro   []  sup   []  int   []  cont    []  before manual    []  after manual    min []  Ultrasound, settings/location:      min []  Iontophoresis w/ dexamethasone, location:                                               []  take home patch       []  in clinic    min []  Ice     []  Heat    location/position:     min []  Vasopneumatic Device, press/temp:     min []  Other:    [] Skin assessment post-treatment (if applicable):    []  intact    []  redness- no adverse reaction     []redness  adverse reaction:      25/10 min Therapeutic Exercise:  [x]  See flow sheet   Rationale:      increase ROM and increase strength to improve the patients ability to perform ADL's     10 min Therapeutic Activity: [x]  See flow sheet   Rationale:      increase strength to improve the patients ability to dress himself     10 min Manual Therapy: Technique:      [] S/DTM []IASTM [x]PROM [] Passive Stretching   []manual TPR    [x]Jt manipulation:Gr I [] II []  III [] IV[] V[]  Treatment Area:  Carpals, wrist flex/ext   Rationale:      increase ROM to improve patient's ability to perform ADl's        Billed With/As:   [x] TE   [x] TA   [] Neuro   [] Self Care Patient Education: [x] Review HEP    [] Progressed/Changed HEP based on:   [] positioning   [] body mechanics   [] transfers   [] heat/ice application    [] other:        Other Objective/Functional Measures:    See Recert     Post Treatment Pain Level (on 0 to 10) scale:   0  / 10     ASSESSMENT    Assessment/Changes in Function:     See Recert     []  See Progress Note/Recertification   Patient will continue to benefit from skilled PT services to modify and progress therapeutic interventions, address functional mobility deficits, address ROM deficits, address strength deficits, analyze and address soft tissue restrictions, analyze and cue movement patterns, analyze and modify body mechanics/ergonomics and assess and modify postural abnormalities to attain remaining goals.       Progress toward goals / Updated goals:    See Recert     PLAN    [x]  Upgrade activities as tolerated YES Continue plan of care   []  Discharge due to :    []  Other:      Therapist: Fannie Woodward PT    Date: 12/30/2019 Time: 8:07 AM     Future Appointments   Date Time Provider Jessie Triana   12/30/2019  8:30 AM Cat [address] PT REHAB CENTER AT [facility] [address] [facility]   1/10/2020  1:15 PM Coy Singh MD [facility]

## 2019-12-30 NOTE — PROGRESS NOTES
[facility] PHYSICAL THERAPY AT [address], [address], [address] [place] [address]  Phone: [phone]  Fax: [phone]  PROGRESS NOTE  Patient Name: Gulshan Reddy : 1945   Treatment/Medical Diagnosis: Pain in left hand [M79.642]   Referral Source: Evette Freeman MD     Date of Initial Visit: 19 Attended Visits: 12 Missed Visits: 0     SUMMARY OF TREATMENT  Instruction in therex, functional activities, PROM, mobilization to carpals, wrist, elbow, modalities  CURRENT STATUS  Mr. Meghan Grigsby has made slow progress in therapy. Current AROM is as follows: Wrist flex 55 (30 initial), Ext 30 (unchanged), Supination 80 (75-80; full PROM), MMT: PIP ext 2-/5, MP and intrinsics 0-2-/5. Changing structural integrity of the hand with notable shifting of the carpals near the MCP joint. Unable to extend fingers without metacarpal stabilization. Slight improvement in dexterity and fine motor use of digits 1,2 and thumb, little to none digits 3,4. Improved ROM and strength in elbow and shoulder. Previous Goals:  1) Patient to be independent & compliant with progressive HEP in preparation for D/C.  2) Patient will increase elbow and wrist strength by 1 grade to assist with ADL's  3) Patient will increase hand strength to allow patient to button shirt and tie shoes in 50% faster time  4) Patient to increase FOTO to >/=74 indicating improved functional mobility. Prior Level/Current Level:  1) Prior Level: Progressing   Current Level: Independent with current HEP   Goal Met? yes  2) Prior Level: 3-/5 all planes wrist, 0-2- hand   Current Level: 3+/5 wrist in available range, hand unchanged. Improved functional mobility noted   Goal Met? Progressing  3) Prior Level: unable to tie shoes   Current Level: Able to button shirt   Goal Met? Progressing  4) Prior Level: 63   Current Level: Improved Groc to +2   Goal Met?  Progressing    New Goals to be achieved in __3-4__ weeks:  1) Patient to be independent & compliant with progressive HEP in preparation for D/C.  2) Patient will increase elbow and wrist strength by 1 grade to assist with ADL's  3) Patient will increase hand strength to allow patient to button shirt and tie shoes in 50% faster time  4) Patient to increase FOTO to >/=74 indicating improved functional mobility. G-Codes: n/a  RECOMMENDATIONS  Continue PT with focus on functional mobility and independence, prevention of loss of ROM and maintaining joint integrity within the hand. Awaiting results from EMG and physicians recommendations  If you have any questions/comments please contact us directly at [phone]. Thank you for allowing us to assist in the care of your patient. Therapist Signature: Hermann Khan PT Date: 12/30/2019   Reporting Period:  Certification Period:   12/30/19 to 1/29/20 11/13/19 to 2/11/20 Time: 2:45 PM   NOTE TO PHYSICIAN:  PLEASE COMPLETE THE ORDERS BELOW AND FAX TO   [facility] Physical Therapy at [address]: [phone]. If you are unable to process this request in 24 hours please contact our office: [phone].    ___ I have read the above report and request that my patient continue as recommended.   ___ I have read the above report and request that my patient continue therapy with the following changes/special instructions:_________________________________________   ___ I have read the above report and request that my patient be discharged from therapy.      Physician Signature:        Date:       Time:

## 2019-12-31 ENCOUNTER — APPOINTMENT (OUTPATIENT)
Dept: PHYSICAL THERAPY | Age: 74
End: 2019-12-31
Payer: MEDICARE

## 2020-01-08 ENCOUNTER — HOSPITAL ENCOUNTER (OUTPATIENT)
Dept: PHYSICAL THERAPY | Age: 75
Discharge: HOME | End: 2020-01-08
Payer: MEDICARE

## 2020-01-08 PROCEDURE — 97110 THERAPEUTIC EXERCISES: CPT

## 2020-01-08 PROCEDURE — 97140 MANUAL THERAPY 1/> REGIONS: CPT

## 2020-01-08 NOTE — PROGRESS NOTES
PHYSICAL THERAPY - DAILY TREATMENT NOTE    Patient Name: Gulshan Reddy        Date: 2020  : 1945    Patient  Verified: YES  Visit #:   15   of   18  Insurance: Payor: Pauline Robertson / Plan: [place] PART [place] & B / Product Type: Medicare /      In time: 8 Out time: 8:45   Total Treatment Time: 45     Medicare/BCBS Time Tracking (below)   Total Timed Codes (min):  40 1:1 Treatment Time:  40     TREATMENT AREA/ DIAGNOSIS = Pain in left hand [M79.642]    SUBJECTIVE  Pain Level (on 0 to 10 scale):  0  / 10   Medication Changes/New allergies or changes in medical history, any new surgeries or procedures?     NO    If yes, update Summary List   Subjective Functional Status/Changes:  []  No changes reported     Functional improvement I have less pain  Functional limitation it hurts in the morning        OBJECTIVE      8 min Manual Therapy: PROM to L wrist and fingers, focusing on extension   Rationale:      decrease pain, increase ROM and increase tissue extensibility to improve patient's ability to perform ADLs without pain    37 min Therapeutic Exercise:  [x]  See flow sheet   Rationale:      increase ROM and increase strength to improve the patients ability to perform ADLs without pain     Billed With/As:   [x] TE   [] TA   [] Neuro   [] Self Care Patient Education: [x] Review HEP    [] Progressed/Changed HEP based on:   [] positioning   [] body mechanics   [] transfers   [] heat/ice application    [] other:        Other Objective/Functional Measures:    Full wrist and finger extension  Added chair dips for triceps  Added pulley   Post Treatment Pain Level (on 0 to 10) scale:   0  / 10     ASSESSMENT  Assessment/Changes in Function:     Limited progress to date  Splint helping pain at night and AM      []  See Progress Note/Recertification   Patient will continue to benefit from skilled PT services to modify and progress therapeutic interventions, address functional mobility deficits, address ROM deficits, address strength deficits, analyze and address soft tissue restrictions, analyze and cue movement patterns, analyze and modify body mechanics/ergonomics and assess and modify postural abnormalities to attain remaining goals.    Progress toward goals / Updated goals:    Maintaining PROM     PLAN  [x]  Upgrade activities as tolerated YES Continue plan of care   []  Discharge due to :    []  Other:      Therapist: Miranda Reyes PT    Date: 1/8/2020 Time: 8:46 AM     Future Appointments   Date Time Provider Jessie Triana   1/10/2020 10:00 AM Naty Vizcaino PT [facility] AT [facility]   1/10/2020  1:15 PM Given, Marika Vieira MD [facility]

## 2020-01-10 ENCOUNTER — APPOINTMENT (OUTPATIENT)
Dept: PHYSICAL THERAPY | Age: 75
End: 2020-01-10
Payer: MEDICARE

## 2020-01-17 NOTE — PERIOP NOTES
PAT - SURGICAL PRE-ADMISSION INSTRUCTIONS    NAME:  Bhakti Thompsonridge                                                          TODAY'S DATE:  1/17/2020    SURGERY DATE:  1/21/2020                                  SURGERY ARRIVAL TIME:   TBV    1. Do NOT eat or drink anything, including candy or gum, after MIDNIGHT on 01/20/2020 , unless you have specific instructions from your Surgeon or Anesthesia Provider to do so. 2. No smoking on the day of surgery. 3. No alcohol 24 hours prior to the day of surgery. 4. No recreational drugs for one week prior to the day of surgery. 5. Leave all valuables, including money/purse, at home. 6. Remove all jewelry, nail polish, makeup (including mascara); no lotions, powders, deodorant, or perfume/cologne/after shave. 7. Glasses/Contact lenses and Dentures may be worn to the hospital.  They will be removed prior to surgery. 8. Call your doctor if symptoms of a cold or illness develop within 24 ours prior to surgery. 9. AN ADULT MUST DRIVE YOU HOME AFTER OUTPATIENT SURGERY. 10. If you are having an OUTPATIENT procedure, please make arrangements for a responsible adult to be with you for 24 hours after your surgery. 11. If you are admitted to the hospital, you will be assigned to a bed after surgery is complete. Normally a family member will not be able to see you until you are in your assigned bed. 15. Family is encouraged to accompany you to the hospital.  We ask visitors in the treatment area to be limited to ONE person at a time to ensure patient privacy. EXCEPTIONS WILL BE MADE AS NEEDED. 15. Children under 12 are discouraged from entering the treatment area and need to be supervised by an adult when in the waiting room. Special Instructions:     Take these medications the morning of surgery with a sip of water:  Use Inhalers    Patient Prep:    shower with anti-bacterial soap    These surgical instructions were reviewed with Bhakti Tao during the PAT phone call.    Directions: On the morning of surgery, please go to the [facility]. Enter the building from the [facility] entrance, 1st floor (next to the Emergency Room entrance). Take the elevator to the 2nd floor. Sign in at the Registration Desk.     If you have any questions and/or concerns, please do not hesitate to call:  (Prior to the day of surgery)  [facility] unit:  [phone]  (Day of surgery)  [facility] unit:  [phone]

## 2020-01-20 ENCOUNTER — ANESTHESIA EVENT (OUTPATIENT)
Dept: SURGERY | Age: 75
End: 2020-01-20
Payer: MEDICARE

## 2020-01-21 ENCOUNTER — HOSPITAL ENCOUNTER (OUTPATIENT)
Age: 75
Setting detail: OUTPATIENT SURGERY
Discharge: HOME | End: 2020-01-21
Attending: NEUROLOGICAL SURGERY | Admitting: NEUROLOGICAL SURGERY
Payer: MEDICARE

## 2020-01-21 ENCOUNTER — ANESTHESIA (OUTPATIENT)
Dept: SURGERY | Age: 75
End: 2020-01-21
Payer: MEDICARE

## 2020-01-21 VITALS
SYSTOLIC BLOOD PRESSURE: 146 MMHG | WEIGHT: 160.1 LBS | OXYGEN SATURATION: 95 % | TEMPERATURE: 97.1 F | HEIGHT: 65 IN | HEART RATE: 73 BPM | BODY MASS INDEX: 26.67 KG/M2 | DIASTOLIC BLOOD PRESSURE: 67 MMHG | RESPIRATION RATE: 14 BRPM

## 2020-01-21 PROCEDURE — 74011000250 HC RX REV CODE- 250: Performed by: NURSE ANESTHETIST, CERTIFIED REGISTERED

## 2020-01-21 PROCEDURE — 77030031139 HC SUT VCRL2 J&J -A: Performed by: NEUROLOGICAL SURGERY

## 2020-01-21 PROCEDURE — 76210000021 HC REC RM PH II 0.5 TO 1 HR: Performed by: NEUROLOGICAL SURGERY

## 2020-01-21 PROCEDURE — 77030002966 HC SUT PDS J&J -A: Performed by: NEUROLOGICAL SURGERY

## 2020-01-21 PROCEDURE — 74011250636 HC RX REV CODE- 250/636: Performed by: NEUROLOGICAL SURGERY

## 2020-01-21 PROCEDURE — 77030018836 HC SOL IRR NACL ICUM -A: Performed by: NEUROLOGICAL SURGERY

## 2020-01-21 PROCEDURE — 76210000006 HC OR PH I REC 0.5 TO 1 HR: Performed by: NEUROLOGICAL SURGERY

## 2020-01-21 PROCEDURE — 77030003029 HC SUT VCRL J&J -B: Performed by: NEUROLOGICAL SURGERY

## 2020-01-21 PROCEDURE — 74011000250 HC RX REV CODE- 250: Performed by: NEUROLOGICAL SURGERY

## 2020-01-21 PROCEDURE — 77030013079 HC BLNKT BAIR HGGR 3M -A: Performed by: ANESTHESIOLOGY

## 2020-01-21 PROCEDURE — 76060000033 HC ANESTHESIA 1 TO 1.5 HR: Performed by: NEUROLOGICAL SURGERY

## 2020-01-21 PROCEDURE — 74011000272 HC RX REV CODE- 272: Performed by: NEUROLOGICAL SURGERY

## 2020-01-21 PROCEDURE — 76010000149 HC OR TIME 1 TO 1.5 HR: Performed by: NEUROLOGICAL SURGERY

## 2020-01-21 PROCEDURE — 77030012510 HC MSK AIRWY LMA TELE -B: Performed by: ANESTHESIOLOGY

## 2020-01-21 PROCEDURE — 74011250636 HC RX REV CODE- 250/636: Performed by: NURSE ANESTHETIST, CERTIFIED REGISTERED

## 2020-01-21 RX ORDER — SODIUM CHLORIDE 0.9 % (FLUSH) 0.9 %
5-40 SYRINGE (ML) INJECTION AS NEEDED
Status: DISCONTINUED | OUTPATIENT
Start: 2020-01-21 | End: 2020-01-21 | Stop reason: HOSPADM

## 2020-01-21 RX ORDER — LIDOCAINE HYDROCHLORIDE AND EPINEPHRINE 10; 10 MG/ML; UG/ML
INJECTION, SOLUTION INFILTRATION; PERINEURAL AS NEEDED
Status: DISCONTINUED | OUTPATIENT
Start: 2020-01-21 | End: 2020-01-21 | Stop reason: HOSPADM

## 2020-01-21 RX ORDER — SODIUM CHLORIDE 0.9 % (FLUSH) 0.9 %
5-40 SYRINGE (ML) INJECTION EVERY 8 HOURS
Status: DISCONTINUED | OUTPATIENT
Start: 2020-01-21 | End: 2020-01-21 | Stop reason: HOSPADM

## 2020-01-21 RX ORDER — PROPOFOL 10 MG/ML
INJECTION, EMULSION INTRAVENOUS AS NEEDED
Status: DISCONTINUED | OUTPATIENT
Start: 2020-01-21 | End: 2020-01-21 | Stop reason: HOSPADM

## 2020-01-21 RX ORDER — ALBUTEROL SULFATE 0.83 MG/ML
2.5 SOLUTION RESPIRATORY (INHALATION)
Status: COMPLETED | OUTPATIENT
Start: 2020-01-21 | End: 2020-01-21

## 2020-01-21 RX ORDER — CEFAZOLIN SODIUM 2 G/50ML
2 SOLUTION INTRAVENOUS
Status: COMPLETED | OUTPATIENT
Start: 2020-01-21 | End: 2020-01-21

## 2020-01-21 RX ORDER — MIDAZOLAM HYDROCHLORIDE 1 MG/ML
INJECTION, SOLUTION INTRAMUSCULAR; INTRAVENOUS AS NEEDED
Status: DISCONTINUED | OUTPATIENT
Start: 2020-01-21 | End: 2020-01-21 | Stop reason: HOSPADM

## 2020-01-21 RX ORDER — VANCOMYCIN HYDROCHLORIDE 1 G/20ML
INJECTION, POWDER, LYOPHILIZED, FOR SOLUTION INTRAVENOUS AS NEEDED
Status: DISCONTINUED | OUTPATIENT
Start: 2020-01-21 | End: 2020-01-21 | Stop reason: HOSPADM

## 2020-01-21 RX ORDER — LIDOCAINE HYDROCHLORIDE 20 MG/ML
INJECTION, SOLUTION EPIDURAL; INFILTRATION; INTRACAUDAL; PERINEURAL AS NEEDED
Status: DISCONTINUED | OUTPATIENT
Start: 2020-01-21 | End: 2020-01-21 | Stop reason: HOSPADM

## 2020-01-21 RX ORDER — SODIUM CHLORIDE, SODIUM LACTATE, POTASSIUM CHLORIDE, CALCIUM CHLORIDE 600; 310; 30; 20 MG/100ML; MG/100ML; MG/100ML; MG/100ML
INJECTION, SOLUTION INTRAVENOUS
Status: DISCONTINUED | OUTPATIENT
Start: 2020-01-21 | End: 2020-01-21 | Stop reason: HOSPADM

## 2020-01-21 RX ORDER — ONDANSETRON 2 MG/ML
INJECTION INTRAMUSCULAR; INTRAVENOUS AS NEEDED
Status: DISCONTINUED | OUTPATIENT
Start: 2020-01-21 | End: 2020-01-21 | Stop reason: HOSPADM

## 2020-01-21 RX ORDER — FENTANYL CITRATE 50 UG/ML
INJECTION, SOLUTION INTRAMUSCULAR; INTRAVENOUS AS NEEDED
Status: DISCONTINUED | OUTPATIENT
Start: 2020-01-21 | End: 2020-01-21 | Stop reason: HOSPADM

## 2020-01-21 RX ORDER — EPHEDRINE SULFATE/0.9% NACL/PF 50 MG/5 ML
SYRINGE (ML) INTRAVENOUS AS NEEDED
Status: DISCONTINUED | OUTPATIENT
Start: 2020-01-21 | End: 2020-01-21 | Stop reason: HOSPADM

## 2020-01-21 RX ORDER — DEXAMETHASONE SODIUM PHOSPHATE 4 MG/ML
INJECTION, SOLUTION INTRA-ARTICULAR; INTRALESIONAL; INTRAMUSCULAR; INTRAVENOUS; SOFT TISSUE AS NEEDED
Status: DISCONTINUED | OUTPATIENT
Start: 2020-01-21 | End: 2020-01-21 | Stop reason: HOSPADM

## 2020-01-21 RX ADMIN — MIDAZOLAM 1 MG: 1 INJECTION INTRAMUSCULAR; INTRAVENOUS at 10:10

## 2020-01-21 RX ADMIN — FENTANYL CITRATE 50 MCG: 50 INJECTION, SOLUTION INTRAMUSCULAR; INTRAVENOUS at 10:23

## 2020-01-21 RX ADMIN — ALBUTEROL SULFATE 2.5 MG: 2.5 SOLUTION RESPIRATORY (INHALATION) at 11:44

## 2020-01-21 RX ADMIN — DEXAMETHASONE SODIUM PHOSPHATE 6 MG: 4 INJECTION, SOLUTION INTRA-ARTICULAR; INTRALESIONAL; INTRAMUSCULAR; INTRAVENOUS; SOFT TISSUE at 10:31

## 2020-01-21 RX ADMIN — PROPOFOL 150 MG: 10 INJECTION, EMULSION INTRAVENOUS at 10:23

## 2020-01-21 RX ADMIN — SODIUM CHLORIDE, SODIUM LACTATE, POTASSIUM CHLORIDE, AND CALCIUM CHLORIDE: 600; 310; 30; 20 INJECTION, SOLUTION INTRAVENOUS at 10:10

## 2020-01-21 RX ADMIN — CEFAZOLIN SODIUM 2 G: 2 SOLUTION INTRAVENOUS at 10:27

## 2020-01-21 RX ADMIN — ONDANSETRON 4 MG: 2 SOLUTION INTRAMUSCULAR; INTRAVENOUS at 11:22

## 2020-01-21 RX ADMIN — Medication 5 MG: at 10:36

## 2020-01-21 RX ADMIN — LIDOCAINE HYDROCHLORIDE 60 MG: 20 INJECTION, SOLUTION INTRAVENOUS at 10:23

## 2020-01-21 RX ADMIN — FENTANYL CITRATE 25 MCG: 50 INJECTION, SOLUTION INTRAMUSCULAR; INTRAVENOUS at 10:32

## 2020-01-21 RX ADMIN — FENTANYL CITRATE 25 MCG: 50 INJECTION, SOLUTION INTRAMUSCULAR; INTRAVENOUS at 10:58

## 2020-01-21 NOTE — DISCHARGE INSTRUCTIONS
FOLLOW UP VISIT Appointment in: Ten Days Remove gauze on Thursday then may take shower and wet incisions     Patient armband removed and given to patient to take home. Patient was informed of the privacy risks if armband lost or stolen    DISCHARGE SUMMARY from Nurse    PATIENT INSTRUCTIONS:    After general anesthesia or intravenous sedation, for 24 hours or while taking prescription Narcotics:  · Limit your activities  · Do not drive and operate hazardous machinery  · Do not make important personal or business decisions  · Do  not drink alcoholic beverages  · If you have not urinated within 8 hours after discharge, please contact your surgeon on call. Report the following to your surgeon:  · Excessive pain, swelling, redness or odor of or around the surgical area  · Temperature over 100.5  · Nausea and vomiting lasting longer than 4 hours or if unable to take medications  · Any signs of decreased circulation or nerve impairment to extremity: change in color, persistent  numbness, tingling, coldness or increase pain  · Any questions    What to do at Home:      These are general instructions for a healthy lifestyle:    No smoking/ No tobacco products/ Avoid exposure to second hand smoke  Surgeon General's Warning:  Quitting smoking now greatly reduces serious risk to your health. Obesity, smoking, and sedentary lifestyle greatly increases your risk for illness    A healthy diet, regular physical exercise & weight monitoring are important for maintaining a healthy lifestyle    You may be retaining fluid if you have a history of heart failure or if you experience any of the following symptoms:  Weight gain of 3 pounds or more overnight or 5 pounds in a week, increased swelling in our hands or feet or shortness of breath while lying flat in bed. Please call your doctor as soon as you notice any of these symptoms; do not wait until your next office visit.         The discharge information has been reviewed with the patient. The patient verbalized understanding. Discharge medications reviewed with the patient and appropriate educational materials and side effects teaching were provided.   ___________________________________________________________________________________________________________________________________

## 2020-01-21 NOTE — PERIOP NOTES
Phase 2 Recovery Summary    Report received from KEEGAN byers    Vitals:    01/21/20 1205 01/21/20 1209 01/21/20 1215 01/21/20 1222   BP: 134/57 141/58 135/58 146/67   Pulse: 71 71 68 73   Resp: 13 18 10 14   Temp:       SpO2: 95% 93% 94% 95%   Weight:       Height:           oriented to time, place, person and situation    Lines and Drains  Peripheral Intravenous Line:  Removed prior to discharge. Wound     Wound Wrist Left (Active)   Dressing Status Clean, dry, and intact 1/21/2020 12:26 PM   Dressing Type Gauze wrap (neva); Topical skin adhesive/glue 1/21/2020 12:26 PM   Splint Type/Material Sling 1/21/2020 12:26 PM   Incision Site Well Approximated Yes 1/21/2020 11:18 AM   Number of days: 0       Wound Arm upper Left (Active)   Dressing Status Clean, dry, and intact 1/21/2020 12:26 PM   Dressing Type Topical skin adhesive/glue 1/21/2020 12:26 PM   Incision Site Well Approximated Yes 1/21/2020 12:26 PM   Drainage Amount None 1/21/2020 12:26 PM   Number of days: 0        Patient assisted to chair with minimal assist. Pateint tolerating liquids well. Patient's family at bedside. Discharge discussed with patient and family. No questions or concerns at this time. Patient had time to ask any questions. Denies pain. Discharge medications reviewed with patient and spouse and appropriate educational materials and side effects teaching were provided. Patient discharged to home, with wife.       Devin Vivar RN

## 2020-01-21 NOTE — PERIOP NOTES
Pre-Op Summary    Pt arrived via car with family/friend and is oriented to time, place, person and situation. Patient with steady gait with none assistive devices. Visit Vitals  /73 (BP 1 Location: Right arm, BP Patient Position: Sitting)   Pulse (!) 58   Temp 98 °F (36.7 °C)   Resp 16   Ht 5' 5\" (1.651 m)   Wt 72.6 kg (160 lb 1.6 oz)   SpO2 96%   BMI 26.64 kg/m²       Peripheral IV located on Right hand . Patients belongings are located with wife. Patient's point of contact is Fiordaliza Aleman and their contact number is: [phone] . They will be leaving and coming back. They are able to receive medication information. They will be their ride home.

## 2020-01-21 NOTE — ANESTHESIA PREPROCEDURE EVALUATION
Relevant Problems   No relevant active problems       Anesthetic History          Comments: History of CHF postop S/P CEA 2018     Review of Systems / Medical History  Patient summary reviewed and pertinent labs reviewed    Pulmonary    COPD: mild    Sleep apnea: No treatment  Smoker (current everyday, per vascular note 0.10 x 35 yrs)         Neuro/Psych              Cardiovascular    Hypertension: well controlled              Exercise tolerance: >4 METS: Completes ADLs, housework and groceries. SOB when goes up 2 flights quickly.   Comments: 2018 ECHO- normal LV systolic fx, EF 47%, Mod AS, mild AR    2018 NST- negative for ischemia, EF 53%, no wall motion abn   GI/Hepatic/Renal     GERD: well controlled           Endo/Other        Arthritis and cancer (Prostate)     Other Findings   Comments: AAA 3.35x3.15; checked twice a year, seen by Vascular 6/12/19- stable    S/P Right CEA 8/13/18, Vascular 6/2019-\"carotid looks good\"    S/P Minimally invasive Left C5-6 and C7-7 hemilaminectomy, foraminotomies w/ microdissection 2012           Physical Exam    Airway  Mallampati: III  TM Distance: > 6 cm  Neck ROM: decreased range of motion   Mouth opening: Normal     Cardiovascular    Rhythm: regular  Rate: normal         Dental    Dentition: Caps/crowns     Pulmonary      Decreased breath sounds: bibasilar           Abdominal  GI exam deferred       Other Findings            Anesthetic Plan    ASA: 3  Anesthesia type: general            Anesthetic plan and risks discussed with: Patient

## 2020-01-21 NOTE — ANESTHESIA POSTPROCEDURE EVALUATION
Procedure(s):  Left ulnar nerve and carpel tunnel release. general    Anesthesia Post Evaluation      Multimodal analgesia: multimodal analgesia used between 6 hours prior to anesthesia start to PACU discharge  Patient location during evaluation: bedside  Patient participation: complete - patient participated  Level of consciousness: awake  Pain management: adequate  Airway patency: patent  Anesthetic complications: no  Cardiovascular status: stable  Respiratory status: acceptable  Hydration status: acceptable  Post anesthesia nausea and vomiting:  controlled      Vitals Value Taken Time   /58 1/21/2020 12:15 PM   Temp 36.2 °C (97.1 °F) 1/21/2020 11:35 AM   Pulse 70 1/21/2020 12:18 PM   Resp 14 1/21/2020 12:18 PM   SpO2 94 % 1/21/2020 12:19 PM   Vitals shown include unvalidated device data.

## 2020-01-21 NOTE — H&P
History and Physical reviewed; I have examined the patient and there are no pertinent changes.     Tamir Ruff MD   7:55 AM 1/21/2020

## 2020-01-21 NOTE — BRIEF OP NOTE
BRIEF OPERATIVE NOTE    Date of Procedure: 1/21/2020   Preoperative Diagnosis: ulnar neuropathy  g56.20  Postoperative Diagnosis: ulnar neuropathy  g56.20    Procedure(s):  Left ulnar nerve and carpel tunnel release  Surgeon(s) and Role:     * Juni Chavez MD - Primary         Surgical Assistant: Mendel Lara    Surgical Staff:  Circ-1: Lily Palacios RN  Circ-2: Maria Alejandra Miller RN  Scrub Tech-1: [facility]  Surg Asst-1: Elisabeth Sink  Event Time In Time Out   Incision Start 1033    Incision Close 1124      Anesthesia: General   Estimated Blood Loss: -  Specimens: * No specimens in log *   Findings: -   Complications: -  Implants: * No implants in log *

## 2020-01-22 ENCOUNTER — HOSPITAL ENCOUNTER (OUTPATIENT)
Dept: PHYSICAL THERAPY | Age: 75
End: 2020-01-22
Payer: MEDICARE

## 2020-01-22 NOTE — OP NOTES
[facility]  OPERATIVE REPORT    Name:  David Bailey  MR#:   [id number]  :  1945  ACCOUNT #:  [de-identified]  DATE OF SERVICE:  2020    PREOPERATIVE DIAGNOSES:  Left ulnar neuropathy and left carpal tunnel syndrome. POSTOPERATIVE DIAGNOSES:  Left ulnar neuropathy and left carpal tunnel syndrome. PROCEDURES PERFORMED:  1. Left ulnar nerve release. 2.  Left carpal tunnel release. SURGEON:  MD Jovana Taveras    ANESTHESIA:  General orotracheal.    COMPLICATIONS:  None. SPECIMENS REMOVED:  None. IMPLANTS:  None. ESTIMATED BLOOD LOSS:  Minimal.    BRIEF CLINICAL NOTE:  This man has presented with an acute progressive  radicular syndrome, had surgery for that. His pain is relieved but continues to have weakness in the hand. He now feels that the hand has been weaker. EMG nerve conduction study shows the above findings and requested to proceed as noted. PROCEDURE IN DETAIL:  After informed consent was given, the patient was brought to the operating room and underwent the induction of general orotracheal anesthesia without difficulty. This was with an LMA. He was then carefully positioned supine upon the operating room table with all pressure points carefully padded. Two incisions were outlined, one at the left cubital tunnel, one at the left wrist and we prepped and draped in a usual sterile fashion. Local anesthetic was infiltrated. The incisions were open. We begun with the ulnar nerve, the incision was opened here, dissected down to the cubital tunnel, identified the nerve proximally, followed it end of cubital tunnel. There was moderately significant compression over a fairly long distance. So the scissors was used to open the tunnel well until the nerve  into the flexor muscle mass. It was now completely decompressed. A couple of 3-0 Vicryl were then used to imbricate the fascia away from the nerve.   We irrigated and closed in layers with vancomycin powder, 3-0 Vicryl, 4-0 Monocryl, 5-0 PDS and Prineo on the skin. Before closing the skin, attention had been turned to the left palmar incision which was opened, a small incision in line with the ringer finger dissected to palmaris longus and transverse carpal ligament with #15 blade knife, until the median nerve was identified. We sectioned distally, turned out had to be very very far into the palm although the fat pad was identified. There was still compressive band and took some time and completely opened it well into the palm until it was decompressed distally. The attention was then turned proximally and also there was a long area of transverse carpal ligament here that was sectioned into the distal forearm. The nerve was now completely decompressed. The compression was noted to be quiet significant. A freer was able to be passed into the distal forearm. We irrigated, put two 4-0 Vicryl sutures in the subcutaneous tissue to give some strength here and then 3-0 Nylon vertical mattress sutures, sterile dressings and standard wrap. All sponge and needle counts were correct at the end of the case, and the patient was extubated from the LMA and taken to recovery in stable condition.       MD ALISHA Thakur/[id number]  D:  01/21/2020 11:46  T:  01/21/2020 22:22  JOB #:  [id number]

## 2020-01-24 ENCOUNTER — APPOINTMENT (OUTPATIENT)
Dept: PHYSICAL THERAPY | Age: 75
End: 2020-01-24
Payer: MEDICARE

## 2020-03-18 NOTE — PROGRESS NOTES
[address] [place] [address] PHYSICAL THERAPY AT [address], [address] [place] [address], [address], [address] [place] [address]  Phone: [phone]  Fax: [phone] FOR PHYSICAL THERAPY          Patient Name: Pablito Magallanes : 1945   Treatment/Medical Diagnosis: Pain in left hand [M79.642]   Onset Date: 19    Referral Source: Yakov Bolanos MD Start of Care [facility]):    Prior Hospitalization: See Medical History Provider #: [id number]   Prior Level of Function: No functional limitations   Comorbidities: Surgery C7, C6, CA   Medications: Verified on Patient Summary List   [facility] from [facility]: 13 Missed Visits: 0     Goal/Measure of Progress Goal Met? 1. Patient to be independent & compliant with progressive HEP in preparation for D/C. Status at last Eval: ongoing Current Status: Independent with current HEP yes   2. Patient will increase elbow and wrist strength by 1 grade to assist with ADL's   Status at last Eval: 3-/5 all planes Current Status: Unable to assess no   3. Patient will increase hand strength to allow patient to button shirt and tie shoes in 50% faster time   Status at last Eval: Unable to button shirt Current Status: Improved ability to button shirt Progressing   4. Patient to increase FOTO to >/=74 indicating improved functional mobility. Status at last Eval: 61 Current Status: Unable to assess no     See last progress note for updated functional gains. Patient returned to clinic for only one follow up after last note. Minimal progress in active ROM of hand, improved in elbow and wrist.  Patient did not return to clinic    G-Codes (GP): n/a  Assessments/Recommendations: Other: Discontinue    If you have any questions/comments please contact us directly at  [phone]. Thank you for allowing us to assist in the care of your patient.     Therapist Signature: Adele Maynard PT Date: 3/18/120   Reporting Period: 19 to 20 Time: 12:12 PM

## 2020-03-19 ENCOUNTER — HOSPITAL ENCOUNTER (OUTPATIENT)
Dept: PHYSICAL THERAPY | Age: 75
Discharge: HOME | End: 2020-03-19
Payer: MEDICARE

## 2020-03-19 PROCEDURE — 97162 PT EVAL MOD COMPLEX 30 MIN: CPT

## 2020-03-19 PROCEDURE — 97110 THERAPEUTIC EXERCISES: CPT

## 2020-03-19 NOTE — PROGRESS NOTES
[address] PHYSICAL THERAPY AT [address], [address] [place] [address] Way, [facility], [address] [facility] [address] - Phone: [phone]  Fax: [phone] / [facility]  Patient Name: [facility] : 1945   Medical   Diagnosis: Left wrist pain [M25.532] Treatment Diagnosis: L UE weakness/ decreased mobility   Onset Date: 2019     Referral Source: Kofi Nelson MD Start of [facility]): 3/19/2020   Prior Hospitalization: See medical history Provider #: [id number]   Prior Level of Function: Full ability to use l UE   Comorbidities: Tobacco use, CA   Medications: Verified on Patient Summary List   The Plan of Care and following information is based on the information from the initial evaluation.   ================================================================  Assessment / ramos information: [facility] Mario is a 66 y.o.  yo male with L UE weakness and pain. He reports insidious onset of L UE pain beginning in 2019. He underwent Cx debridment 2 day following onset. He has had a course of PT. Symptoms did not change. Further testing followed. He then underwent surgery for L carpal tunnel and ulnar nerve entrapment. Mr. Cindy Causey reports no change following this surgery. On assessment, Mr. Cindy Causey presents with well healing surgical incisions. L UE deficits are as follows: shoulder flex= 128, scap= 138, elbow ext= -20, wrist flex= 40, wrist ext= 40. L UE strength deficits are as follows: shoulder ER= 4/5, IR= 4+/5, elbow ext= 3+/5, wrist flex= 4/5, ext= 3/5, finger ext= 0/5, ulnar deviation= 0/5.    (Po position 2): R= 45#, L= 20#     ================================================================  Eval Complexity: History MEDIUM  Complexity : 1-2 comorbidities / personal factors will impact the outcome/ POC ;  Examination  MEDIUM Complexity : 3 Standardized tests and measures addressing body structure, function, activity limitation and / or participation in recreation ; Presentation MEDIUM Complexity : Evolving with changing characteristics ; Decision MakingMEDIUM: PT assess of strenght/ ROM  Overall Complexity MEDIUM  Problem List: decrease ROM, decrease strength, decrease ADL/ functional abilitiies, decrease activity tolerance and decrease flexibility/ joint mobility   Treatment Plan may include any combination of the following: Therapeutic exercise, Therapeutic activities, Neuromuscular re-education, Physical agent/modality, Manual therapy and Patient education  Patient / Family readiness to learn indicated by: asking questions, trying to perform skills and interest  Persons(s) to be included in education: patient (P)  Barriers to Learning/Limitations: None  Measures taken, if barriers to learning:    Patient Goal (s): Increase strength and mobility   Patient self reported health status: good  Rehabilitation Potential: fair     Short Term Goals: To be accomplished in  2  weeks:  1 Patient will report >= 25% improvement in symptoms with ADLs  2 Patient will be educated in appropriate ROM, stabilization, strengthening exercises. 3 Restore L UE strength by 1/2  MMT grade for weakened muscles identified to improved ADL participation. Kurt Felder Long Term Goals: To be accomplished in  4-6  weeks:  1 Patient to report >= 750 improvement in symptoms with ADLs. 2 Patient will be independent with finalized HEP/ self maintenance. 3IGROC score >= +4 for improved ADLs  4 Restore L UE strength by >= 1 MMT grade for weakened muscles identified to improved ADL participation.   5 Increase L  (Po position 2) to >= 30# for improved     Frequency / Duration:   Patient to be seen  2  times per week for 4-6  weeks:  Patient / Caregiver education and instruction: self care, activity modification and exercises    Therapist Signature: Ravi Sarabia PT Date: 5/63/8409   Certification Period: 3/19/20 to 6/16/20 Time: 1:12 PM ===================================================================  I certify that the above Physical Therapy Services are being furnished while the patient is under my care. I agree with the treatment plan and certify that this therapy is necessary. Physician Signature:        Date:       Time:     Please sign and return to [facility] [place] or you may fax the signed copy to [phone]. Thank you.

## 2020-03-19 NOTE — PROGRESS NOTES
PHYSICAL THERAPY - DAILY TREATMENT NOTE     Patient Name: Steve Michaels        Date: 3/19/2020  : 1945   YES Patient  Verified  Visit #:   1     Insurance: Payor: Ena Linker / Plan: [place] MEDICARE PART A & B / Product Type: Medicare /      In time: 1200 Out time: 110   Total Treatment Time: 70     Medicare/BCBS Time Tracking (below)   Total Timed Codes (min):  20 1:1 Treatment Time:  70     TREATMENT AREA =  Left wrist pain [M25.532]    SUBJECTIVE    Pain Level (on 0 to 10 scale):  0  / 10   Medication Changes/New allergies or changes in medical history, any new surgeries or procedures?     NO    If yes, update Summary List   Subjective Functional Status/Changes:  []  No changes reported     See eval /POC         OBJECTIVE  Modalities Rationale:     decrease pain to improve patient's ability to return to PLOF      min [] Estim, type/location:                                      []  att     []  unatt     []  w/US     []  w/ice    []  w/heat    min []  Mechanical Traction: type/lbs                   []  pro   []  sup   []  int   []  cont    []  before manual    []  after manual    min []  Ultrasound, settings/location:      min []  Iontophoresis w/ dexamethasone, location:                                               []  take home patch       []  in clinic    min []  Ice     []  Heat    location/position:     min []  Vasopneumatic Device, press/temp:     min []  Other:    [] Skin assessment post-treatment (if applicable):    []  intact    []  redness- no adverse reaction     []redness  adverse reaction:      10 min Therapeutic Exercise:  [x]  See flow sheet   Rationale:      increase ROM and increase strength to improve the patients ability to return to PLOF     10 min Manual Therapy: Technique:      [] S/DTM []IASTM []PROM [] Passive Stretching   []manual TPR    []Jt manipulation:Gr I [] II []  III [] IV[]  []REIL with manual OP  Treatment Area:     Rationale:      decrease pain, increase ROM, increase tissue extensibility and decrease trigger points to improve patient's ability to return to PLOF     min Neuromuscular Re-ed: [x]  See flow sheet   Rationale:      improve coordination, improve balance, increase proprioception and dec dizziness to improve the patients ability to return to PLOF        min Self Care:    Rationale:    increase ROM, increase strength and improve coordination to improve the patients ability to return to PLOF    Billed With/As:   [] TE   [] TA   [] Neuro   [] Self Care Patient Education: [x] Review HEP    [] Progressed/Changed HEP based on:   [] positioning   [] body mechanics   [] transfers   [] heat/ice application    [] other:        Other Objective/Functional Measures:    See eval/ POC     Post Treatment Pain Level (on 0 to 10) scale:   0  / 10     ASSESSMENT    X  See POC     PLAN    [x]  Upgrade activities as tolerated {YES) Continue plan of care   []  Discharge due to :    []  Other:      Therapist: Monae Enciso, PT    Date: 3/19/2020 Time: 1:10 PM   No future appointments.

## 2020-03-23 ENCOUNTER — HOSPITAL ENCOUNTER (OUTPATIENT)
Dept: PHYSICAL THERAPY | Age: 75
End: 2020-03-23
Payer: MEDICARE

## 2020-03-25 ENCOUNTER — APPOINTMENT (OUTPATIENT)
Dept: PHYSICAL THERAPY | Age: 75
End: 2020-03-25
Payer: MEDICARE

## 2020-04-01 ENCOUNTER — APPOINTMENT (OUTPATIENT)
Dept: PHYSICAL THERAPY | Age: 75
End: 2020-04-01

## 2020-04-03 ENCOUNTER — APPOINTMENT (OUTPATIENT)
Dept: PHYSICAL THERAPY | Age: 75
End: 2020-04-03

## 2020-07-10 ENCOUNTER — HOSPITAL ENCOUNTER (OUTPATIENT)
Dept: PHYSICAL THERAPY | Age: 75
Discharge: HOME | End: 2020-07-10
Payer: MEDICARE

## 2020-07-10 PROCEDURE — 97110 THERAPEUTIC EXERCISES: CPT

## 2020-07-10 PROCEDURE — 97140 MANUAL THERAPY 1/> REGIONS: CPT

## 2020-07-10 NOTE — PROGRESS NOTES
[address] [facility] PHYSICAL THERAPY AT [facility] [address]. [place], [facility]  Phone: [phone]  Fax: [phone] OF [address] [place] [address]          Patient Name: Malini Daley : 1945   Treatment/Medical Diagnosis: Pain in left hand [M79.642]   Onset Date:     Referral Source: Christiano Morales MD Start of Care [facility]): 3/19/20   Prior Hospitalization: See Medical History Provider #: [id number]   Prior Level of Function: Full use of L UE   Comorbidities: Tobacco use and CA   Medications: Verified on Patient Summary List   Visits from [facility]: 2 Missed Visits: 0     Goal/Measure of Progress Goal Met? 1.  Pt to report 75% improvement   Status at last Eval: - Current Status: 0% no   2. GROC of >=+4   Status at last Eval: - Current Status: -2, a little worse no   3. Increase L UE  strength to >=30#,  To indicate increased function   Status at last Eval: 20# Current Status: 20# no     Key Functional Changes/Progress: the patient was evaluated on 3/19/20, but had to be put on hold due to the CoVid 19. He returned today, with the same symptoms:  123 degrees of L shoulder flexion, 3-/5 weakness in L tricep, wrist extensor, and shoulder ER. Problem List: decrease ROM, decrease strength, decrease ADL/ functional abilitiies, decrease activity tolerance and decrease flexibility/ joint mobility   Treatment Plan may include any combination of the following: Therapeutic exercise, Therapeutic activities, Neuromuscular re-education, Physical agent/modality, Manual therapy and Patient education  Patient Goal(s) has been updated and includes:  Regain strength    Goals for this certification period include and are to be achieved in   4-5  weeks:  1. Active L shoulder elevation to > 140 degrees to facilitate overhead ADLs  2. Increase L  strength to> 30 #, for independence with ADLs  3.   Increase L Tricep strength to >=4/5, to allow pushing activities  Frequency / Duration:   Patient to be seen   2   times per week for   4-5    weeks:    Assessments/Recommendations: Continue PT  If you have any questions/comments please contact us directly at [phone]. Thank you for allowing us to assist in the care of your patient. Therapist Signature: Reza Anders PT, MDT Date: 6/74/5941   Certification Period:  Reporting Period: 7/10/20-10/7/20  3/19/20-7/10/20 Time: 10:22 AM   NOTE TO PHYSICIAN:  PLEASE COMPLETE THE ORDERS BELOW AND FAX TO   [facility] Physical Therapy at [place]: [phone]. If you are unable to process this request in 24 hours please contact our office: [phone].    ___ I have read the above report and request that my patient continue as recommended.   ___ I have read the above report and request that my patient continue therapy with the following changes/special instructions: ________________________________________________   ___ I have read the above report and request that my patient be discharged from therapy.      Physician Signature:        Date:       Time:

## 2020-07-10 NOTE — PROGRESS NOTES
PHYSICAL THERAPY - DAILY TREATMENT NOTE    Patient Name: Sherry Nelson        Date: 7/10/2020  : 1945   YES Patient  Verified  Visit #:     Insurance: Payor: VA MEDICARE / Plan: VA MEDICARE PART A & B / Product Type: Medicare /      In time: 9:30 Out time: 10:10   Total Treatment Time: 40     Medicare Time Tracking (below)   Total Timed Codes (min):  40 1:1 Treatment Time:  40     TREATMENT AREA =  L UE     SUBJECTIVE    Pain Level (on 0 to 10 scale):  0  / 10   Medication Changes/New allergies or changes in medical history, any new surgeries or procedures? NO    If yes, update Summary List   Subjective Functional Status/Changes:  []  No changes reported     No change       OBJECTIVE    32 min Therapeutic Exercise:  [x]  See flow sheet   Rationale:      increase ROM and increase strength to improve the patients ability to perform ADLs without pain     8 min Manual Therapy: PROM to L hand andwrist   Rationale:      decrease pain, increase ROM and increase tissue extensibility to improve patient's ability to perform ADLs without pain      Billed With/As:   [x] TE   [] TA   [] Neuro   [] Self Care Patient Education: [x] Review HEP    [] Progressed/Changed HEP based on:   [] positioning   [] body mechanics   [] transfers   [] heat/ice application    [] other:       min Patient Education:  YES  Reviewed HEP   []  Progressed/Changed HEP based on:         Other Objective/Functional Measures:    See 701     Post Treatment Pain Level (on 0 to 10) scale:   0  / 10     ASSESSMENT    []  See Progress Note/Recertification   Patient will continue to benefit from skilled therapy to address remaining functional deficits: see 701l   Progress toward goals / Updated goals:    -     PLAN    [x]  Upgrade activities as tolerated YES Continue plan of care   []  Discharge due to :    []  Other:      Therapist: Jose Rose PT    Date: 7/10/2020 Time: 10:20 AM

## 2020-07-15 ENCOUNTER — APPOINTMENT (OUTPATIENT)
Dept: PHYSICAL THERAPY | Age: 75
End: 2020-07-15
Payer: MEDICARE

## 2020-07-17 ENCOUNTER — HOSPITAL ENCOUNTER (OUTPATIENT)
Dept: PHYSICAL THERAPY | Age: 75
Discharge: HOME | End: 2020-07-17
Payer: MEDICARE

## 2020-07-17 PROCEDURE — 97140 MANUAL THERAPY 1/> REGIONS: CPT

## 2020-07-17 PROCEDURE — 97110 THERAPEUTIC EXERCISES: CPT

## 2020-07-17 NOTE — PROGRESS NOTES
PHYSICAL THERAPY - DAILY TREATMENT NOTE    Patient Name: Maisha Mills        Date: 2020  : 1945    Patient  Verified: YES  Visit #:   3   of   12  Insurance: Payor: Lanette Gamboa / Plan: VA MEDICARE PART A & B / Product Type: Medicare /      In time: 9:30 Out time: 10:10   Total Treatment Time: 40     Medicare/BCBS Time Tracking (below)   Total Timed Codes (min):  40 1:1 Treatment Time:  40     TREATMENT AREA/ DIAGNOSIS = Pain in left hand [M79.642]    SUBJECTIVE  Pain Level (on 0 to 10 scale):  0  / 10   Medication Changes/New allergies or changes in medical history, any new surgeries or procedures?     NO    If yes, update Summary List   Subjective Functional Status/Changes:  []  No changes reported     Functional improvement no pain   Functional limitation I cant use my hand very well        OBJECTIVE      8 min Manual Therapy: PROM to wrist and fingers, focusing on extension   Rationale:      decrease pain, increase ROM and increase tissue extensibility to improve patient's ability to perform ADLs without pain    32 min Therapeutic Exercise:  [x]  See flow sheet   Rationale:      increase ROM and increase strength to improve the patients ability to perform ADLs without pain     Billed With/As:   [x] TE   [] TA   [] Neuro   [] Self Care Patient Education: [x] Review HEP    [] Progressed/Changed HEP based on:   [] positioning   [] body mechanics   [] transfers   [] heat/ice application    [] other:        Other Objective/Functional Measures:    Pt with no change in strength  Added pulley for sh flexion and added chair dips ( with LE assist)   Post Treatment Pain Level (on 0 to 10) scale:   0  / 10     ASSESSMENT  Assessment/Changes in Function:     Still weak in L sh ER, wrist ext and elbow extension     []  See Progress Note/Recertification   Patient will continue to benefit from skilled PT services to modify and progress therapeutic interventions, address functional mobility deficits, address ROM deficits, address strength deficits and analyze and address soft tissue restrictions to attain remaining goals.    Progress toward goals / Updated goals:    -     PLAN  [x]  Upgrade activities as tolerated YES Continue plan of care   []  Discharge due to :    []  Other:      Therapist: Jen Kwon PT    Date: 7/17/2020 Time: 10:10 AM     Future Appointments   Date Time Provider Jessie Triana   7/20/2020  1:20 PM Tona Lewis [facility] OpenPM   7/22/2020  9:15 AM Ambar Cartagena PT [facility]

## 2020-07-22 ENCOUNTER — HOSPITAL ENCOUNTER (OUTPATIENT)
Dept: PHYSICAL THERAPY | Age: 75
Discharge: HOME | End: 2020-07-22
Payer: MEDICARE

## 2020-07-22 PROCEDURE — 97140 MANUAL THERAPY 1/> REGIONS: CPT

## 2020-07-22 PROCEDURE — 97110 THERAPEUTIC EXERCISES: CPT

## 2020-07-22 NOTE — PROGRESS NOTES
PHYSICAL THERAPY - DAILY TREATMENT NOTE    Patient Name: Tosha Samayoa        Date: 2020  : 1945    Patient  Verified: YES  Visit #:      12  Insurance: Payor: Alexandre Ford / Plan: VA MEDICARE PART A & B / Product Type: Medicare /      In time: 9:15 Out time: 9:45   Total Treatment Time: 30     Medicare/BCBS Time Tracking (below)   Total Timed Codes (min):  30 1:1 Treatment Time:  30     TREATMENT AREA/ DIAGNOSIS = Pain in left hand [M79.642]    SUBJECTIVE  Pain Level (on 0 to 10 scale):  0  / 10   Medication Changes/New allergies or changes in medical history, any new surgeries or procedures?     NO    If yes, update Summary List   Subjective Functional Status/Changes:  []  No changes reported     Functional improvement no change   Functional limitation  My hand gets stiff          8 min Manual Therapy: Passive wrist ext/mobs and passive finger extension   Rationale:      decrease pain, increase ROM and increase tissue extensibility to improve patient's ability to perform ADLs without pain    22 min Therapeutic Exercise:  [x]  See flow sheet   Rationale:      increase ROM and increase strength to improve the patients ability to perform ADLs without pain     Billed With/As:   [x] TE   [] TA   [] Neuro   [] Self Care Patient Education: [x] Review HEP    [] Progressed/Changed HEP based on:   [] positioning   [] body mechanics   [] transfers   [] heat/ice application    [] other:        Other Objective/Functional Measures:    Pt with 21.1# of  strength  Reports no change with ADLs   Post Treatment Pain Level (on 0 to 10) scale:   0  / 10     ASSESSMENT  Assessment/Changes in Function:     Pt encouraged to at least continue for one month  Slight increase in  strength     []  See Progress Note/Recertification   Patient will continue to benefit from skilled PT services to modify and progress therapeutic interventions, address functional mobility deficits, address ROM deficits, address strength deficits, analyze and address soft tissue restrictions, analyze and cue movement patterns, analyze and modify body mechanics/ergonomics and assess and modify postural abnormalities to attain remaining goals.    Progress toward goals / Updated goals:    NA     PLAN  [x]  Upgrade activities as tolerated YES Continue plan of care   []  Discharge due to :    []  Other:      Therapist: Cecilio Edwards PT    Date: 7/22/2020 Time: 9:48 AM     Future Appointments   Date Time Provider Jessie Triana   7/29/2020  8:30 AM Sosa Goddard PT [facility]   7/31/2020  8:45 AM Sosa Goddard PT [facility]   1/19/2021  9:30 AM [facility] NURSE [facility] OpenPM   1/26/2021  9:30 AM Anh Singh MD [facility] OpenPM

## 2020-07-29 ENCOUNTER — APPOINTMENT (OUTPATIENT)
Dept: PHYSICAL THERAPY | Age: 75
End: 2020-07-29
Payer: MEDICARE

## 2020-07-31 ENCOUNTER — HOSPITAL ENCOUNTER (OUTPATIENT)
Dept: PHYSICAL THERAPY | Age: 75
Discharge: HOME | End: 2020-07-31
Payer: MEDICARE

## 2020-07-31 PROCEDURE — 97140 MANUAL THERAPY 1/> REGIONS: CPT

## 2020-07-31 PROCEDURE — 97110 THERAPEUTIC EXERCISES: CPT

## 2020-07-31 NOTE — PROGRESS NOTES
PHYSICAL THERAPY - DAILY TREATMENT NOTE    Patient Name: Merrill Durand        Date: 2020  : 1945    Patient  Verified: YES  Visit #:      12  Insurance: Payor: Peg Oyster / Plan: VA MEDICARE PART A & B / Product Type: Medicare /      In time: 8:45 Out time: 9:20   Total Treatment Time: 35     Medicare/BCBS Time Tracking (below)   Total Timed Codes (min):  40 1:1 Treatment Time:  40     TREATMENT AREA/ DIAGNOSIS = Pain in left hand [M79.642]    SUBJECTIVE  Pain Level (on 0 to 10 scale):  0  / 10   Medication Changes/New allergies or changes in medical history, any new surgeries or procedures?     NO    If yes, update Summary List   Subjective Functional Status/Changes:  []  No changes reported     Functional improvement im a little stronger   Functional limitation I cant  things        OBJECTIVE      8 min Manual Therapy: PROM to L hand wrist   Rationale:      decrease pain, increase ROM and increase tissue extensibility to improve patient's ability to perform ADLs without pain    32 min Therapeutic Exercise:  [x]  See flow sheet   Rationale:      increase ROM and increase strength to improve the patients ability to perform ADLs without pain     Billed With/As:   [x] TE   [] TA   [] Neuro   [] Self Care Patient Education: [x] Review HEP    [] Progressed/Changed HEP based on:   [] positioning   [] body mechanics   [] transfers   [] heat/ice application    [] other:        Other Objective/Functional Measures:     strength increased to 24# (setting 2) electronic gripper   Post Treatment Pain Level (on 0 to 10) scale:   0  / 10     ASSESSMENT  Assessment/Changes in Function:     20% increase in  strength     []  See Progress Note/Recertification   Patient will continue to benefit from skilled PT services to modify and progress therapeutic interventions, address functional mobility deficits, address ROM deficits, address strength deficits, analyze and address soft tissue restrictions, analyze and cue movement patterns and analyze and modify body mechanics/ergonomics to attain remaining goals. Progress toward goals / Updated goals:     Increased  strength     PLAN  [x]  Upgrade activities as tolerated YES Continue plan of care   []  Discharge due to :    []  Other:      Therapist: Kimberly Harris PT    Date: 7/31/2020 Time: 9:22 AM     Future Appointments   Date Time Provider Jessie Triana   1/19/2021  9:30 AM [facility] NURSE [facility] OpenPM   1/26/2021  9:30 AM Given, Lai Rodriguez MD [facility] OpenPM

## 2020-08-11 ENCOUNTER — HOSPITAL ENCOUNTER (OUTPATIENT)
Dept: PHYSICAL THERAPY | Age: 75
Discharge: HOME | End: 2020-08-11
Payer: MEDICARE

## 2020-08-11 PROCEDURE — 97140 MANUAL THERAPY 1/> REGIONS: CPT

## 2020-08-11 PROCEDURE — 97110 THERAPEUTIC EXERCISES: CPT

## 2020-08-11 NOTE — PROGRESS NOTES
[facility] PHYSICAL THERAPY AT [facility] 93. [place], [address] [place] [address]  Phone: [phone]  Fax: [phone] [address]          Patient Name: Sherry Nelson : 1945   Treatment/Medical Diagnosis: Pain in left hand [M79.642]   Onset Date:     Referral Source: Tania Velazquez MD Start of On license of [facility]): 3/19/20   Prior Hospitalization: See Medical History Provider #: [id number]   Prior Level of Function: Full use of L UE   Comorbidities: Tobacco use and CA   Medications: Verified on Patient Summary List   Visits from [facility]: 6 Missed Visits: 1     Goal/Measure of Progress Goal Met? 1. Active L shoulder elevation to > 140 degrees    Status at last Eval: 123 degrees of L shoulder elevation actively Current Status: 125 degrees no   2. Increase L Gri[p strength to >30#, for independence with  ADLs   Status at last Eval: 20# Current Status: 24.4# progressing   3. Increase L Tricep strength to > 4/5, to allow pushing activities   Status at last Eval: 3-/5 Current Status: 4-/5 progressing     Key Functional Changes/Progress: the patient has improved his L  strength by 20%, to 24.4# (was 20#). He can generate 44# of force with pushing L (79# R). He is still weak in with tricep extension, shoulder elevation, and . Problem List: decrease ROM, decrease strength, decrease ADL/ functional abilitiies, decrease activity tolerance and decrease flexibility/ joint mobility   Treatment Plan may include any combination of the following: Therapeutic exercise, Therapeutic activities, Neuromuscular re-education, Physical agent/modality, Manual therapy and Patient education  Patient Goal(s) has been updated and includes:  Regain strength    Goals for this certification period include and are to be achieved in   4-5  weeks:  1. Active L shoulder elevation to > 140 degrees to facilitate overhead ADLs  2. Increase L  strength to> 30 #, for independence with ADLs  3. Increase L Tricep strength to >=4/5, to allow pushing activities  Frequency / Duration:   Patient to be seen   2   times per week for   4-5    weeks:    Assessments/Recommendations: Continue PT  If you have any questions/comments please contact us directly at [phone]. Thank you for allowing us to assist in the care of your patient. Therapist Signature: Alfredito Henley PT, MDT Date: 6/12/0543   Certification Period:  Reporting Period: 7/10/20-10/7/20  7/10/20-8/11/20 Time: 10:22 AM   NOTE TO PHYSICIAN:  PLEASE COMPLETE THE ORDERS BELOW AND FAX TO   [facility] Physical Therapy at [place]: [phone]. If you are unable to process this request in 24 hours please contact our office: [phone].    ___ I have read the above report and request that my patient continue as recommended.   ___ I have read the above report and request that my patient continue therapy with the following changes/special instructions: ________________________________________________   ___ I have read the above report and request that my patient be discharged from therapy.      Physician Signature:        Date:       Time:

## 2020-08-18 ENCOUNTER — APPOINTMENT (OUTPATIENT)
Dept: PHYSICAL THERAPY | Age: 75
End: 2020-08-18
Payer: MEDICARE

## 2020-12-09 NOTE — PROGRESS NOTES
[address] [facility] PHYSICAL THERAPY AT [facility] [address] [place], [place] [facility]  Phone: [phone]  Fax: [phone]  DISCHARGE FOR PHYSICAL THERAPY          Patient Name: Alanis Vega : 1945   Treatment/Medical Diagnosis: Pain in left hand [M79.642]   Onset Date:     Referral Source: Mercedes Pierce MD [facility]): 3/19/20   Prior Hospitalization: See Medical History Provider #: [id number]   Prior Level of Function: Full use of L UE   Comorbidities: Tobacco use and CA   Medications: Verified on Patient Summary List   Visits from [facility]: 6 Missed Visits: 1     Goal/Measure of Progress Goal Met? 1. Active L shoulder elevation to > 140 degrees    Status at last Eval: 123 degrees of L shoulder elevation actively Current Status: 125 degrees no   2. Increase L Gri[p strength to >30#, for independence with  ADLs   Status at last Eval: 20# Current Status: 24.4# progressing   3. Increase L Tricep strength to > 4/5, to allow pushing activities   Status at last Eval: 3-/5 Current Status: 4-/5 progressing     Key Functional Changes/Progress: the patient has not returned to therapy since his 20 reassessment and has been discharged. .       Assessments/Recommendations: DC patient  If you have any questions/comments please contact us directly at [phone]. Thank you for allowing us to assist in the care of your patient. Therapist Signature:  Nivia Gordillo PT, MDT Date:    Certification Period:  Reporting Period: 7/10/20-10/7/20  7/10/20-20 Time: 10:22 AM

## 2021-02-23 PROBLEM — R29.898 LEFT HAND WEAKNESS: Status: ACTIVE | Noted: 2020-06-09

## 2021-02-23 PROBLEM — D03.72 MELANOMA IN SITU OF LEFT LOWER EXTREMITY (HCC): Status: ACTIVE | Noted: 2018-12-20

## 2021-02-23 PROBLEM — I65.23 BILATERAL CAROTID ARTERY STENOSIS: Status: ACTIVE | Noted: 2017-04-06

## 2021-02-23 PROBLEM — G56.22 CUBITAL TUNNEL SYNDROME ON LEFT: Status: ACTIVE | Noted: 2020-10-27

## 2021-02-23 PROBLEM — K22.2 SCHATZKI'S RING: Status: ACTIVE | Noted: 2018-09-01

## 2021-02-23 PROBLEM — I35.0 AORTIC VALVE STENOSIS: Status: ACTIVE | Noted: 2018-10-10

## 2021-02-23 PROBLEM — Q21.12 PFO (PATENT FORAMEN OVALE): Status: ACTIVE | Noted: 2018-10-10

## 2022-08-23 ENCOUNTER — PREPPED CHART (OUTPATIENT)
Age: 77
End: 2022-08-23

## 2022-08-23 ASSESSMENT — VISUAL ACUITY
OD_SC: 20/60-2
OU_CC: 20/25-2
OS_CC: 20/20
OU_SC: 20/25-2
OS_CC: 20/25-2
OD_CC: 20/40-2
OU_CC: 20/20
OD_CC: 20/50
OS_SC: 20/25

## 2022-08-23 ASSESSMENT — TONOMETRY
OD_IOP_MMHG: 14
OS_IOP_MMHG: 15

## (undated) DEVICE — TELFA NON-ADHERENT ABSORBENT DRESSING: Brand: TELFA

## (undated) DEVICE — BANDAGE COMPR W4INXL5YD BGE COHESIVE SELF ADH ADBAN CBN1104] AVCOR HEALTHCARE PRODUCTS INC]

## (undated) DEVICE — VESSEL LOOPS,MAXI, RED: Brand: DEVON

## (undated) DEVICE — STERILE POLYISOPRENE POWDER-FREE SURGICAL GLOVES: Brand: PROTEXIS

## (undated) DEVICE — DRESSING,GAUZE,XEROFORM,CURAD,1"X8",ST: Brand: CURAD

## (undated) DEVICE — SOLUTION IRRIGATION H2O 0797305] ICU MEDICAL INC]

## (undated) DEVICE — ROCKER SWITCH PENCIL HOLSTER: Brand: VALLEYLAB

## (undated) DEVICE — BIPOLAR FORCEPS CORD,BANANA LEADS: Brand: VALLEYLAB

## (undated) DEVICE — BANDAGE,GAUZE,BULKEE II,4.5"X4.1YD,STRL: Brand: MEDLINE

## (undated) DEVICE — KIT PROC EXTRM HND FT CUST LF --

## (undated) DEVICE — Device

## (undated) DEVICE — TABLE COVER: Brand: CONVERTORS

## (undated) DEVICE — SUTURE VCRL SZ 3-0 L18IN ABSRB UD L26MM SH 1/2 CIR J864D

## (undated) DEVICE — 10FR FRAZIER SUCTION HANDLE: Brand: CARDINAL HEALTH

## (undated) DEVICE — SOLUTION IV 1000ML 0.9% SOD CHL

## (undated) DEVICE — SPONGE GZ W4XL4IN COT 12 PLY TYP VII WVN C FLD DSGN

## (undated) DEVICE — ASTOUND STANDARD SURGICAL GOWN, XXL: Brand: CONVERTORS

## (undated) DEVICE — BANDAGE COMPR EXSANGUATION SGL LAYERED NO CLSR 9FT LEN 4IN W

## (undated) DEVICE — DERMACEA GAUZE ROLL: Brand: DERMACEA

## (undated) DEVICE — SUTURE PDS II SZ 4 0 L18IN ABSRB UD PC 5 L19MM 3 8 CIR Z823G

## (undated) DEVICE — SUTURE VCRL SZ 4-0 L27IN ABSRB UD L19MM PS-2 3/8 CIR PRIM J426H